# Patient Record
Sex: FEMALE | Race: OTHER | URBAN - METROPOLITAN AREA
[De-identification: names, ages, dates, MRNs, and addresses within clinical notes are randomized per-mention and may not be internally consistent; named-entity substitution may affect disease eponyms.]

---

## 2017-01-29 ENCOUNTER — INPATIENT (INPATIENT)
Facility: HOSPITAL | Age: 82
LOS: 10 days | Discharge: NURSING, REHAB OR LONG-TERM CARE | DRG: 56 | End: 2017-02-09
Attending: SPECIALIST | Admitting: SPECIALIST
Payer: MEDICARE

## 2017-01-29 VITALS
OXYGEN SATURATION: 96 % | SYSTOLIC BLOOD PRESSURE: 143 MMHG | DIASTOLIC BLOOD PRESSURE: 70 MMHG | HEART RATE: 86 BPM | RESPIRATION RATE: 18 BRPM | TEMPERATURE: 98 F

## 2017-01-29 DIAGNOSIS — D72.829 ELEVATED WHITE BLOOD CELL COUNT, UNSPECIFIED: ICD-10-CM

## 2017-01-29 DIAGNOSIS — F03.90 UNSPECIFIED DEMENTIA, UNSPECIFIED SEVERITY, WITHOUT BEHAVIORAL DISTURBANCE, PSYCHOTIC DISTURBANCE, MOOD DISTURBANCE, AND ANXIETY: ICD-10-CM

## 2017-01-29 DIAGNOSIS — R63.8 OTHER SYMPTOMS AND SIGNS CONCERNING FOOD AND FLUID INTAKE: ICD-10-CM

## 2017-01-29 DIAGNOSIS — Z41.8 ENCOUNTER FOR OTHER PROCEDURES FOR PURPOSES OTHER THAN REMEDYING HEALTH STATE: ICD-10-CM

## 2017-01-29 DIAGNOSIS — I63.9 CEREBRAL INFARCTION, UNSPECIFIED: ICD-10-CM

## 2017-01-29 DIAGNOSIS — E87.1 HYPO-OSMOLALITY AND HYPONATREMIA: ICD-10-CM

## 2017-01-29 DIAGNOSIS — I99.8 OTHER DISORDER OF CIRCULATORY SYSTEM: ICD-10-CM

## 2017-01-29 DIAGNOSIS — E86.0 DEHYDRATION: ICD-10-CM

## 2017-01-29 DIAGNOSIS — E11.9 TYPE 2 DIABETES MELLITUS WITHOUT COMPLICATIONS: ICD-10-CM

## 2017-01-29 LAB
ALBUMIN SERPL ELPH-MCNC: 2.2 G/DL — LOW (ref 3.4–5)
ALP SERPL-CCNC: 84 U/L — SIGNIFICANT CHANGE UP (ref 40–120)
ALT FLD-CCNC: 20 U/L — SIGNIFICANT CHANGE UP (ref 12–42)
ANION GAP SERPL CALC-SCNC: 8 MMOL/L — LOW (ref 9–16)
APPEARANCE UR: SIGNIFICANT CHANGE UP
APTT BLD: 25.9 SEC — LOW (ref 27.5–37.4)
AST SERPL-CCNC: 19 U/L — SIGNIFICANT CHANGE UP (ref 15–37)
BASOPHILS NFR BLD AUTO: 0.6 % — SIGNIFICANT CHANGE UP (ref 0–2)
BILIRUB SERPL-MCNC: 0.2 MG/DL — SIGNIFICANT CHANGE UP (ref 0.2–1.2)
BILIRUB UR-MCNC: NEGATIVE — SIGNIFICANT CHANGE UP
BUN SERPL-MCNC: 29 MG/DL — HIGH (ref 7–23)
CALCIUM SERPL-MCNC: 8.3 MG/DL — LOW (ref 8.5–10.5)
CHLORIDE SERPL-SCNC: 101 MMOL/L — SIGNIFICANT CHANGE UP (ref 96–108)
CO2 SERPL-SCNC: 21 MMOL/L — LOW (ref 22–31)
COLOR SPEC: YELLOW — SIGNIFICANT CHANGE UP
CREAT SERPL-MCNC: 0.32 MG/DL — LOW (ref 0.5–1.3)
DIFF PNL FLD: NEGATIVE — SIGNIFICANT CHANGE UP
EOSINOPHIL NFR BLD AUTO: 1.5 % — SIGNIFICANT CHANGE UP (ref 0–6)
GLUCOSE SERPL-MCNC: 106 MG/DL — HIGH (ref 70–99)
GLUCOSE UR QL: NEGATIVE — SIGNIFICANT CHANGE UP
HCT VFR BLD CALC: 28.3 % — LOW (ref 34.5–45)
HGB BLD-MCNC: 9.4 G/DL — LOW (ref 11.5–15.5)
INR BLD: 1.07 — SIGNIFICANT CHANGE UP (ref 0.88–1.16)
KETONES UR-MCNC: NEGATIVE — SIGNIFICANT CHANGE UP
LACTATE SERPL-SCNC: 2.9 MMOL/L — HIGH (ref 0.5–2)
LEUKOCYTE ESTERASE UR-ACNC: NEGATIVE — SIGNIFICANT CHANGE UP
LYMPHOCYTES # BLD AUTO: 11.5 % — LOW (ref 13–44)
MCHC RBC-ENTMCNC: 23.6 PG — LOW (ref 27–34)
MCHC RBC-ENTMCNC: 33.2 G/DL — SIGNIFICANT CHANGE UP (ref 32–36)
MCV RBC AUTO: 71.1 FL — LOW (ref 80–100)
MONOCYTES NFR BLD AUTO: 7.5 % — SIGNIFICANT CHANGE UP (ref 2–14)
NEUTROPHILS NFR BLD AUTO: 78.9 % — HIGH (ref 43–77)
NITRITE UR-MCNC: NEGATIVE — SIGNIFICANT CHANGE UP
PH UR: 7 — SIGNIFICANT CHANGE UP (ref 4–8)
PLATELET # BLD AUTO: 369 K/UL — SIGNIFICANT CHANGE UP (ref 150–400)
POTASSIUM SERPL-MCNC: 5.3 MMOL/L — SIGNIFICANT CHANGE UP (ref 3.5–5.3)
POTASSIUM SERPL-SCNC: 5.3 MMOL/L — SIGNIFICANT CHANGE UP (ref 3.5–5.3)
PROT SERPL-MCNC: 6.5 G/DL — SIGNIFICANT CHANGE UP (ref 6.4–8.2)
PROT UR-MCNC: (no result) MG/DL
PROTHROM AB SERPL-ACNC: 11.9 SEC — SIGNIFICANT CHANGE UP (ref 10–13.1)
RAPID RVP RESULT: SIGNIFICANT CHANGE UP
RBC # BLD: 3.98 M/UL — SIGNIFICANT CHANGE UP (ref 3.8–5.2)
RBC # FLD: 17.8 % — HIGH (ref 10.3–16.9)
SODIUM SERPL-SCNC: 130 MMOL/L — LOW (ref 135–145)
SP GR SPEC: 1.01 — SIGNIFICANT CHANGE UP (ref 1–1.03)
UROBILINOGEN FLD QL: 0.2 E.U./DL — SIGNIFICANT CHANGE UP
WBC # BLD: 16.2 K/UL — HIGH (ref 3.8–10.5)
WBC # FLD AUTO: 16.2 K/UL — HIGH (ref 3.8–10.5)

## 2017-01-29 PROCEDURE — 71010: CPT | Mod: 26

## 2017-01-29 PROCEDURE — 99285 EMERGENCY DEPT VISIT HI MDM: CPT

## 2017-01-29 RX ORDER — LOSARTAN POTASSIUM 100 MG/1
100 TABLET, FILM COATED ORAL DAILY
Qty: 0 | Refills: 0 | Status: DISCONTINUED | OUTPATIENT
Start: 2017-01-29 | End: 2017-02-01

## 2017-01-29 RX ORDER — SODIUM CHLORIDE 9 MG/ML
125 INJECTION INTRAMUSCULAR; INTRAVENOUS; SUBCUTANEOUS ONCE
Qty: 0 | Refills: 0 | Status: COMPLETED | OUTPATIENT
Start: 2017-01-29 | End: 2017-01-29

## 2017-01-29 RX ORDER — SODIUM CHLORIDE 9 MG/ML
1000 INJECTION INTRAMUSCULAR; INTRAVENOUS; SUBCUTANEOUS ONCE
Qty: 0 | Refills: 0 | Status: COMPLETED | OUTPATIENT
Start: 2017-01-29 | End: 2017-01-29

## 2017-01-29 RX ORDER — LOSARTAN POTASSIUM 100 MG/1
1 TABLET, FILM COATED ORAL
Qty: 0 | Refills: 0 | COMMUNITY

## 2017-01-29 RX ORDER — TRAZODONE HCL 50 MG
1 TABLET ORAL
Qty: 0 | Refills: 0 | COMMUNITY

## 2017-01-29 RX ORDER — ASCORBIC ACID 60 MG
500 TABLET,CHEWABLE ORAL DAILY
Qty: 0 | Refills: 0 | Status: DISCONTINUED | OUTPATIENT
Start: 2017-01-29 | End: 2017-02-09

## 2017-01-29 RX ORDER — SENNA PLUS 8.6 MG/1
0 TABLET ORAL
Qty: 0 | Refills: 0 | COMMUNITY

## 2017-01-29 RX ORDER — GABAPENTIN 400 MG/1
100 CAPSULE ORAL
Qty: 0 | Refills: 0 | Status: DISCONTINUED | OUTPATIENT
Start: 2017-01-29 | End: 2017-01-29

## 2017-01-29 RX ORDER — INSULIN LISPRO 100/ML
VIAL (ML) SUBCUTANEOUS
Qty: 0 | Refills: 0 | Status: DISCONTINUED | OUTPATIENT
Start: 2017-01-29 | End: 2017-02-01

## 2017-01-29 RX ORDER — GABAPENTIN 400 MG/1
0 CAPSULE ORAL
Qty: 0 | Refills: 0 | COMMUNITY

## 2017-01-29 RX ORDER — GABAPENTIN 400 MG/1
1 CAPSULE ORAL
Qty: 0 | Refills: 0 | COMMUNITY

## 2017-01-29 RX ORDER — FOLIC ACID 0.8 MG
1 TABLET ORAL
Qty: 0 | Refills: 0 | COMMUNITY

## 2017-01-29 RX ORDER — LORATADINE 10 MG/1
10 TABLET ORAL DAILY
Qty: 0 | Refills: 0 | Status: DISCONTINUED | OUTPATIENT
Start: 2017-01-29 | End: 2017-01-29

## 2017-01-29 RX ORDER — SODIUM CHLORIDE 9 MG/ML
1000 INJECTION INTRAMUSCULAR; INTRAVENOUS; SUBCUTANEOUS
Qty: 0 | Refills: 0 | Status: DISCONTINUED | OUTPATIENT
Start: 2017-01-29 | End: 2017-01-30

## 2017-01-29 RX ORDER — FOLIC ACID 0.8 MG
1 TABLET ORAL DAILY
Qty: 0 | Refills: 0 | Status: DISCONTINUED | OUTPATIENT
Start: 2017-01-29 | End: 2017-02-09

## 2017-01-29 RX ORDER — FOLIC ACID 0.8 MG
0 TABLET ORAL
Qty: 0 | Refills: 0 | COMMUNITY

## 2017-01-29 RX ORDER — LOSARTAN POTASSIUM 100 MG/1
0 TABLET, FILM COATED ORAL
Qty: 0 | Refills: 0 | COMMUNITY

## 2017-01-29 RX ORDER — ATORVASTATIN CALCIUM 80 MG/1
1 TABLET, FILM COATED ORAL
Qty: 0 | Refills: 0 | COMMUNITY

## 2017-01-29 RX ORDER — TRAZODONE HCL 50 MG
100 TABLET ORAL AT BEDTIME
Qty: 0 | Refills: 0 | Status: DISCONTINUED | OUTPATIENT
Start: 2017-01-29 | End: 2017-01-29

## 2017-01-29 RX ORDER — ATORVASTATIN CALCIUM 80 MG/1
0 TABLET, FILM COATED ORAL
Qty: 0 | Refills: 0 | COMMUNITY

## 2017-01-29 RX ORDER — LORATADINE 10 MG/1
0 TABLET ORAL
Qty: 0 | Refills: 0 | COMMUNITY

## 2017-01-29 RX ORDER — LORATADINE 10 MG/1
1 TABLET ORAL
Qty: 0 | Refills: 0 | COMMUNITY

## 2017-01-29 RX ORDER — ATORVASTATIN CALCIUM 80 MG/1
20 TABLET, FILM COATED ORAL AT BEDTIME
Qty: 0 | Refills: 0 | Status: DISCONTINUED | OUTPATIENT
Start: 2017-01-29 | End: 2017-02-09

## 2017-01-29 RX ORDER — ASCORBIC ACID 60 MG
1 TABLET,CHEWABLE ORAL
Qty: 0 | Refills: 0 | COMMUNITY

## 2017-01-29 RX ORDER — TRAZODONE HCL 50 MG
0 TABLET ORAL
Qty: 0 | Refills: 0 | COMMUNITY

## 2017-01-29 RX ORDER — HEPARIN SODIUM 5000 [USP'U]/ML
5000 INJECTION INTRAVENOUS; SUBCUTANEOUS EVERY 12 HOURS
Qty: 0 | Refills: 0 | Status: DISCONTINUED | OUTPATIENT
Start: 2017-01-29 | End: 2017-02-06

## 2017-01-29 RX ADMIN — SODIUM CHLORIDE 1000 MILLILITER(S): 9 INJECTION INTRAMUSCULAR; INTRAVENOUS; SUBCUTANEOUS at 14:03

## 2017-01-29 RX ADMIN — GABAPENTIN 100 MILLIGRAM(S): 400 CAPSULE ORAL at 18:48

## 2017-01-29 RX ADMIN — HEPARIN SODIUM 5000 UNIT(S): 5000 INJECTION INTRAVENOUS; SUBCUTANEOUS at 18:48

## 2017-01-29 RX ADMIN — SODIUM CHLORIDE 75 MILLILITER(S): 9 INJECTION INTRAMUSCULAR; INTRAVENOUS; SUBCUTANEOUS at 18:18

## 2017-01-29 RX ADMIN — SODIUM CHLORIDE 125 MILLILITER(S): 9 INJECTION INTRAMUSCULAR; INTRAVENOUS; SUBCUTANEOUS at 15:49

## 2017-01-29 NOTE — ED ADULT TRIAGE NOTE - CHIEF COMPLAINT QUOTE
BIBA from home c/o decreased appetite as per son and requiring peg tube. Son also reports multiple bed sores. Denies fever.

## 2017-01-29 NOTE — H&P ADULT. - ASSESSMENT
Patient is a 89 yo Female pmh of multiple CVAs many years ago, contracted and non-verbal at baseline, HTN, DM2, who was brought in by family member for worsening PO intake

## 2017-01-29 NOTE — H&P ADULT. - PROBLEM SELECTOR PLAN 4
Likely end stage, family wants full treatment at this point.  Consider palliative consult if no PEG tube. With toes cyanosis, poor candidate for any type of intervention per vascular surgery.  Will continue to monitor.

## 2017-01-29 NOTE — PROGRESS NOTE ADULT - SUBJECTIVE AND OBJECTIVE BOX
As per patient's son - she is non-ambulatory, non-verbal.   89yoF brought in by family for decreased po intake x several days. No vomiting or diarrhea. Pt with hx of dementia and multiple CVA with residual BUE and BLE contractures, complicated by unstageable sacral decubitus and BL LE ulcers. Family and HHA report that pt normally take po food, generally good appetite. Her baseline mental status waxes and wanes between responding with certain words and being awake and then being more somnolent and sleeping. No change in breathing. Pt had similar sx previously, had PEG tube placed, which helped her become more awake, alert and responsive and functional. She was seen by Dr. Lucero of GI, who planned for PEG tube placement to help rehydrate and provide appropriate nutrition for her. Family also states that she was treated with abx 2 weeks ago for possible infection of her sacral decubitus ulcer and also that the toes of her L feet have been blue for the past few day      PAST MEDICAL & SURGICAL HISTORY:  Hypercholesteremia  Diabetes  CVA (cerebral vascular accident)  HTN (hypertension)      ROS: See HPI    MEDICATIONS  (STANDING):    MEDICATIONS  (PRN):      Allergies    No Known Allergies    Intolerances        SOCIAL HISTORY:  Smoke: Never Smoker  EtOH: occasional    FAMILY HISTORY:      Vital Signs Last 24 Hrs  T(C): 36.6, Max: 36.9 ( @ 12:29)  T(F): 97.8, Max: 98.5 ( @ 12:29)  HR: 71 (71 - 86)  BP: 176/58 (143/70 - 176/58)  BP(mean): --  RR: 16 (16 - 18)  SpO2: 98% (96% - 98%)    PHYSICAL EXAM  Exam:   Neuro: AOX3, calm, NAD.   Gen: extremely cachectic, malnourished, de-conditioned female  HEENT: AT, NC  Neck: No JVD, Normal thyroid, NO carotid bruits bilaterally.   Res: Nml BS, CTAB, no wheezes/rhales or rhonchi.   CV: Normal HS, RRR, no MRG  Pulses: right DP/PT palpable.   Wounds: multiple unstageable pressure wounds in flexor areas of body due to extreme contracture, exam limited.   Left lateral foot - cyanotic, no doppler signal, dry gangrenous wound 2x6 on lateral aspect of foot.   Abd: soft, ND, NT, no masses or organomegaly appreciated      LABS:                        9.4    16.2  )-----------( 369      ( 2017 13:35 )             28.3     2017 13:35    130    |  101    |  29     ----------------------------<  106    5.3     |  21     |  0.32     Ca    8.3        2017 13:35    TPro  6.5    /  Alb  2.2    /  TBili  0.2    /  DBili  x      /  AST  19     /  ALT  20     /  AlkPhos  84     2017 13:35    PT/INR - ( 2017 13:45 )   PT: 11.9 sec;   INR: 1.07          PTT - ( 2017 13:45 )  PTT:25.9 sec  Urinalysis Basic - ( 2017 15:23 )    Color: Yellow / Appearance: SL CLOUDY / S.010 / pH: x  Gluc: x / Ketone: NEGATIVE  / Bili: NEGATIVE / Urobili: 0.2 E.U./dL   Blood: x / Protein: Trace mg/dL / Nitrite: NEGATIVE   Leuk Esterase: NEGATIVE / RBC: < 5 /HPF / WBC < 5 /HPF   Sq Epi: x / Non Sq Epi: Rare /HPF / Bacteria: Present /HPF        RADIOLOGY & ADDITIONAL STUDIES:    Assessment and Plan:  89yFemale  - extremely deconditioned, contracted with multiple unstageable pressure wounds with Left lateral foot wound, dry gangrene - likely secondary to contractures and pressure.   - gangrene/necrosis is dry and does not appear infected, no surgical intervention at this time or re-vascularization planned due to patients extremely poor health status.   - vascular surgery signing off - please reconsult with any additional questions.

## 2017-01-29 NOTE — ED PROVIDER NOTE - SKIN, MLM
unstageable sacral decubitus ulcer with no surrounding cellulitis. +unstagable ulcer in L popliteal fossa with no purulent drainage or cellulitis, +stage 4 ulcer in R popliteal fossa.

## 2017-01-29 NOTE — H&P ADULT. - PROBLEM SELECTOR PLAN 3
Likely hypovolemic hyponatremia, will f/u with BMP after fluid resuscitation.  Will f/u with serum osmo and urine lytes.

## 2017-01-29 NOTE — ED PROVIDER NOTE - OBJECTIVE STATEMENT
89yoF brought in by family for decreased po intake x several days. No vomiting or diarrhea. Pt with hx of dementia and multiple CVA with residual BUE and BLE contractures, complicated by sacral decubitus and BLE ulcers. Family and HHA report that pt normally take po food, generally good appetite. Her baseline mental status waxes and wanes between responding with certain words and being awake and then being more somnolent and sleeping. No change in breathing. Pt had similar sx previously, had PEG tube placed, which helped her become more awake, alert and responsive and functional. She was seen by Dr. Lucero of GI, who planned for PEG tube placement to help rehydrate and provide appropriate nutrition for her. Family also states that she was treated with abx 2 weeks ago for possible infection of her sacral decubitus ulcer and also that the toes of her L feet have been blue for the past few days. No fevers.

## 2017-01-29 NOTE — H&P ADULT. - PROBLEM SELECTOR PLAN 1
Likely due to poor PO intake from end stage dementia.   S/p 1L bolus in the ED, patient still dry on exam, will another 500cc bolus and start fluid at 75cc/hour.  Speech and swallow consult in the AM.  Will f/u with Dr. Lucero in the AM regarding PEG tube. Likely due to poor PO intake from end stage dementia and sedating medications.   S/p 1L bolus in the ED, patient still dry on exam, will another 500cc bolus and start fluid at 75cc/hour.  Holding Trazadone, gabapentin, and lorantine due to sedation.  Speech and swallow consult in the AM.  Will f/u with Dr. Lucero in the AM regarding PEG tube.

## 2017-01-29 NOTE — H&P ADULT. - PROBLEM SELECTOR PLAN 5
Chronically contracted and non-verbal.   Continue with statin. Likely end stage, family wants full treatment at this point.  Consider palliative consult if no PEG tube.

## 2017-01-29 NOTE — ED PROVIDER NOTE - MUSCULOSKELETAL, MLM
contractures to BUE and BLE, L foot exam: cyanosis from toes to midfoot, no palpable PT or DP pulses, no dopplerable PT or DP pulses, cool to touch.

## 2017-01-29 NOTE — ED PROVIDER NOTE - PROGRESS NOTE DETAILS
spoke with vascular, will come see pt in ED spoke with Dr. Lucero, pt's GI MD, pt to be worked up for dehydration vs sepsis and vascular eval for L foot cyanosis, if no intervention to be done by them, then pt to be admitted to his service. pt seen by vascular, no acute intervention to be done, will follow inpt.

## 2017-01-29 NOTE — ED PROVIDER NOTE - MEDICAL DECISION MAKING DETAILS
89yoF here with decreased po intake x several day, o/w at her baseline mental status. Of note, 89yoF here with decreased po intake x several day, o/w at her baseline mental status. Of note, pt with cyanosis to L foot, no palpable or dopplerable pulses on exam, vascular consulted, no acute intervention recommended at this time. Labs with leukocytosis, though no obvious infectious etiology, will send RVP. Elevated lactate likely related to dehydration as pt also with elevated BUN. Pt getting IVF, CXR negative, ua negative, blood/urine cultures pending, hemodynamically stable, admit to medicine.

## 2017-01-29 NOTE — ED ADULT NURSE NOTE - OBJECTIVE STATEMENT
pt to ER for prearranged admission for PEG tube placement after report of not eating or drinking for past two days.  Pt noted to have several pressure ulcers:  approx 6cm unstageable w/ tunneling at sacrum, approx 4cm stage 4 pressure ulcers to R. of sacrum and approx 4cm pressure ulcer to L. of sacrum, approx 3cm stage 2 ulcer to L. trochanter, approx 4cm unstageable ulcer behind R. knee, approx 3 cm stage 4 ulcer behind L. knee, approx 2cm forest area of eschar to lateral side of L. foot above toe 5.  pt no responsive, at times opens eyes but not tracking eyes, breathing slightly labored, skin warm to touch, VSS.  IV access established, labs drawn and sent. IV fluids given per md order.  Will continue to monitor. pt to ER for prearranged admission for PEG tube placement after report of not eating or drinking for past two days.  Sever flexion contractures noted at pt's knees and elbows.  Pt noted to have several pressure ulcers:  approx 6cm unstageable w/ tunneling at sacrum, approx 4cm stage 4 pressure ulcers to R. of sacrum and approx 4cm pressure ulcer to L. of sacrum, approx 3cm stage 2 ulcer to L. trochanter, approx 4cm unstageable ulcer behind R. knee, approx 3 cm stage 4 ulcer behind L. knee, approx 2cm forest area of eschar to lateral side of L. foot above toe 5.  pt no responsive, at times opens eyes but not tracking eyes, breathing slightly labored, skin warm to touch, VSS.  IV access established, labs drawn and sent. IV fluids given per md order.  Will continue to monitor.

## 2017-01-29 NOTE — H&P ADULT. - SKIN COMMENTS
Stage 4 decubs ulcer on sacral, various stages of ulcer on R. popliteal fossa, L posterior calf, black eschar left and right lateral left feet

## 2017-01-29 NOTE — H&P ADULT. - HISTORY OF PRESENT ILLNESS
Patient is a 89 yo Female pmh of multiple CVAs many years ago, 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 Per family, poor PO intake has been going on for many months, gradually decreasing. Per home health aid, patient had very little PO intake for past 2 days. Denied fever/chill, no nausea/vomiting, no sign of distress. About 5 years ago patient had a PEG tube, which improved her nutritional status tremendously so PEG tube was subsequently removed when patient started to tolerate PO again. Family members are hoping another PEG tube can be placed this time. Family member also reported various pressure ulcers on patient buttocks, sacral and lower extremities, which she was getting regular wound care at home by visiting nurse, however wounds are not healing as much lately. Denied worsening erythema or discharge from wounds.  In ED, vitals were stable, noted her cyanosis on her bilateral feet, vascular was consulted, no intervention at this time. She was given 1L bolus NS. CXR and UA were unremarkable.

## 2017-01-29 NOTE — H&P ADULT. - PROBLEM SELECTOR PLAN 2
WBC elevation likely due to dehydration, no sign of aspiration PNA on CXR, UA clear and decubitus ulcer appear non-infectious. Vital signs stable.  Will hold abx for now.  Wound consult in the AM for decubitus ulcers.

## 2017-01-30 DIAGNOSIS — R53.2 FUNCTIONAL QUADRIPLEGIA: ICD-10-CM

## 2017-01-30 DIAGNOSIS — L89.90 PRESSURE ULCER OF UNSPECIFIED SITE, UNSPECIFIED STAGE: ICD-10-CM

## 2017-01-30 DIAGNOSIS — D64.9 ANEMIA, UNSPECIFIED: ICD-10-CM

## 2017-01-30 DIAGNOSIS — R62.7 ADULT FAILURE TO THRIVE: ICD-10-CM

## 2017-01-30 DIAGNOSIS — R13.10 DYSPHAGIA, UNSPECIFIED: ICD-10-CM

## 2017-01-30 DIAGNOSIS — I96 GANGRENE, NOT ELSEWHERE CLASSIFIED: ICD-10-CM

## 2017-01-30 DIAGNOSIS — Z51.5 ENCOUNTER FOR PALLIATIVE CARE: ICD-10-CM

## 2017-01-30 LAB
ALBUMIN SERPL ELPH-MCNC: 2.1 G/DL — LOW (ref 3.4–5)
ANION GAP SERPL CALC-SCNC: 10 MMOL/L — SIGNIFICANT CHANGE UP (ref 9–16)
BUN SERPL-MCNC: 17 MG/DL — SIGNIFICANT CHANGE UP (ref 7–23)
CALCIUM SERPL-MCNC: 8.2 MG/DL — LOW (ref 8.5–10.5)
CHLORIDE SERPL-SCNC: 106 MMOL/L — SIGNIFICANT CHANGE UP (ref 96–108)
CO2 SERPL-SCNC: 19 MMOL/L — LOW (ref 22–31)
CREAT SERPL-MCNC: 0.3 MG/DL — LOW (ref 0.5–1.3)
GLUCOSE SERPL-MCNC: 102 MG/DL — HIGH (ref 70–99)
HCT VFR BLD CALC: 22.8 % — LOW (ref 34.5–45)
HGB BLD-MCNC: 7.5 G/DL — LOW (ref 11.5–15.5)
MCHC RBC-ENTMCNC: 23.2 PG — LOW (ref 27–34)
MCHC RBC-ENTMCNC: 32.9 G/DL — SIGNIFICANT CHANGE UP (ref 32–36)
MCV RBC AUTO: 70.6 FL — LOW (ref 80–100)
OSMOLALITY SERPL: 274 MOSM/KG — LOW (ref 280–301)
PLATELET # BLD AUTO: 408 K/UL — HIGH (ref 150–400)
POTASSIUM SERPL-MCNC: 4.3 MMOL/L — SIGNIFICANT CHANGE UP (ref 3.5–5.3)
POTASSIUM SERPL-SCNC: 4.3 MMOL/L — SIGNIFICANT CHANGE UP (ref 3.5–5.3)
RBC # BLD: 3.23 M/UL — LOW (ref 3.8–5.2)
RBC # FLD: 17.4 % — HIGH (ref 10.3–16.9)
SODIUM SERPL-SCNC: 135 MMOL/L — SIGNIFICANT CHANGE UP (ref 135–145)
WBC # BLD: 11.8 K/UL — HIGH (ref 3.8–10.5)
WBC # FLD AUTO: 11.8 K/UL — HIGH (ref 3.8–10.5)

## 2017-01-30 PROCEDURE — 99223 1ST HOSP IP/OBS HIGH 75: CPT

## 2017-01-30 PROCEDURE — 99497 ADVNCD CARE PLAN 30 MIN: CPT | Mod: 25

## 2017-01-30 RX ORDER — SODIUM CHLORIDE 9 MG/ML
1000 INJECTION INTRAMUSCULAR; INTRAVENOUS; SUBCUTANEOUS
Qty: 0 | Refills: 0 | Status: DISCONTINUED | OUTPATIENT
Start: 2017-01-30 | End: 2017-01-31

## 2017-01-30 RX ADMIN — HEPARIN SODIUM 5000 UNIT(S): 5000 INJECTION INTRAVENOUS; SUBCUTANEOUS at 06:55

## 2017-01-30 RX ADMIN — HEPARIN SODIUM 5000 UNIT(S): 5000 INJECTION INTRAVENOUS; SUBCUTANEOUS at 18:53

## 2017-01-30 NOTE — CONSULT NOTE ADULT - SUBJECTIVE AND OBJECTIVE BOX
CHUCK LUCIO   MRN-[id number]     [id number]    HPI:  Patient is a 89 yo Female pmh of multiple CVAs many years ago, contracted and non-verbal at baseline x years, HTN, DM2, who was brought in by family for worsening PO intake and decline in mental status. Per family, pt had PEG tube placed years ago after stroke left her with significant dysphagia and hemiparesis. Pt's dysphagia improved and pt was able to eat independently. Her PEG tube became dislodged accidentally, so it was left out as pt was eating well. Over last few weeks, pt has had decline in PO intake and overall mental status. In addition, pt has had chronic non-healing wounds which have worsened over last few weeks despite aggressive wound care by family, [facility] wound care and 24 hour aides at home. Seen by their PMD in the home and told to come to the hospital for repeat PEG tube to improve nutritional status in hopes that this might allow for improved wound healing.     PAST MEDICAL & SURGICAL HISTORY:  Hypercholesteremia  Diabetes  CVA (cerebral vascular accident)  HTN (hypertension)  No significant past surgical history      FAMILY HISTORY:  No pertinent family history in first degree relatives    Reviewed     ROS:    Unable to attain due to:  severe dementia/multiple CVA's                    Dyspnea (Benson 0-10):   0                     N/V (Y/N):   N                           Secretions (Y/N) : Y               Agitation(Y/N): N  Pain (Y/N): N      -Provocation/Palliation: Unknown  -Quality/Quantity: Unknown  -Radiating: Unknown  -Severity: Unknown  -Timing/Frequency: Months  -Impact on ADLs:        Allergies    No Known Allergies    Intolerances    Opiate Naive (Y/N): Y  -iStop reviewed (Y/N): N    Medications:      MEDICATIONS  (STANDING):  losartan 100milliGRAM(s) Oral daily  diltiazem    Tablet 180milliGRAM(s) Oral two times a day  atorvastatin 20milliGRAM(s) Oral at bedtime  folic acid 1milliGRAM(s) Oral daily  ascorbic acid 500milliGRAM(s) Oral daily  heparin  Injectable 5000Unit(s) SubCutaneous every 12 hours  insulin lispro (HumaLOG) corrective regimen sliding scale  SubCutaneous Before meals and at bedtime  sodium chloride 0.9%. 1000milliLiter(s) IV Continuous <Continuous>    MEDICATIONS  (PRN):  bisacodyl 5milliGRAM(s) Oral every 12 hours PRN Constipation      Labs:    CBC:                        7.5    11.8  )-----------( 408      ( 2017 08:38 )             22.8     CMP:    2017 08:38    135    |  106    |  17     ----------------------------<  102    4.3     |  19     |  0.30     Ca    8.2        2017 08:38    TPro  x      /  Alb  2.1    /  TBili  x      /  DBili  x      /  AST  x      /  ALT  x      /  AlkPhos  x      2017 08:38    PT/INR - ( 2017 13:45 )   PT: 11.9 sec;   INR: 1.07          PTT - ( 2017 13:45 )  PTT:25.9 sec  Urinalysis Basic - ( 2017 15:23 )    Color: Yellow / Appearance: SL CLOUDY / S.010 / pH: x  Gluc: x / Ketone: NEGATIVE  / Bili: NEGATIVE / Urobili: 0.2 E.U./dL   Blood: x / Protein: Trace mg/dL / Nitrite: NEGATIVE   Leuk Esterase: NEGATIVE / RBC: < 5 /HPF / WBC < 5 /HPF   Sq Epi: x / Non Sq Epi: Rare /HPF / Bacteria: Present /HPF        Imaging:  Reviewed    PEx:  T(C): 37.1, Max: 37.1 ( @ 17:27)  HR: 89 (57 - 89)  BP: 159/86 (131/54 - 185/68)  RR: 18 (16 - 18)  SpO2: 96% (94% - 96%)  Wt(kg): --  Daily Height in cm: 157.48 (2017 19:45)    Daily Weight in k.8 (2017 10:07)  CAPILLARY BLOOD GLUCOSE  91 (2017 17:27)    I&O's Summary  I & Os for 24h ending 2017 07:00  =============================================  IN: 750 ml / OUT: 150 ml / NET: 600 ml    I & Os for current day (as of 2017 17:36)  =============================================  IN: 0 ml / OUT: 1200 ml / NET: -1200 ml      General: eyes open to tactile stimuli. non-verbal, does not follow commands or answer any questions. bedbound with severe contractures of all 4 limbs.   HEENT:  moderate oral secretions  Neck: no JVD  CVS: RRR  Resp: CTAB  GI:  soft, nontender  :  hussein in place  Musc:   severe contractures of all 4 limbs  Neuro: eyes open to tactile stimuli but does not follow commands.   Psych:  calm  Skin: multiple decubiti to sacrum, calves, heels, bony prominences over hips. severe bilat peripheral dry gangrene  Lymph:  Preadmit Karnofsky:  10%           Current Karnofsky:    10 %  Cachexia (Y/N): Y   BMI: 14.8    Advanced Directives:     Full Code         Decision maker: Kip Marie)   Legal surrogate: Kip Marie)     Social History:     GOALS OF CARE DISCUSSION       Palliative care info/counseling provided	           Family meeting       Documentation of GOC: 	          REFERRALS	                 [facility]

## 2017-01-30 NOTE — DIETITIAN INITIAL EVALUATION ADULT. - ENERGY NEEDS
Height: 5 feet 2 inches, Weight (Current): 80 pounds,  pounds +/-10%, %IBW 72.7%, BMI 14.8    IBW used to calculate energy needs due to pt's current body weight is less than % of IBW   EER based on PUs + malnutrition

## 2017-01-30 NOTE — SWALLOW BEDSIDE ASSESSMENT ADULT - ADDITIONAL RECOMMENDATIONS
Pt is at extremely high risk for aspiration, given this recommend NPO unless family documents they wish to accept aspiration risk and initiate comfort feeds.

## 2017-01-30 NOTE — DIETITIAN INITIAL EVALUATION ADULT. - NS AS NUTRI INTERV ENTERAL NUTRITION
If PO diet is not medically feasible s/p swallow recommend alternate means of nutrition: Recommend Glucerna 1.2 x 24 hrs, GR 53ml/hr. Will provide TV 1272, 1526kcal, 76 gm prot, 1023 ml fluids (31kcal/kg, 1.5gm prot/kg, Based on 49.8 kg IBW). Monitor for s/s of tolerance.

## 2017-01-30 NOTE — CONSULT NOTE ADULT - PROBLEM SELECTOR RECOMMENDATION 7
Extensive discussion with pt's Son (HCP) about Mrs. Callahan's life as a Holocaust survivor and her values could she have the opportunity to express them. Son states that they come from a very Buddhism Mu-ism background, and that they are inclined to follow recommendations from their Rabbi that the feeding tube be placed. Son expressed understanding that patient's prognosis in setting of severe dementia and chronic non-healing wounds is likely weeks to months regardless of PEG tube placement. Son was realistic in his expressed sentiments. We discussed possibility of providing more services in the home to improve family support. Will make [facility] hospice referral tomorrow. We will continue to follow this patient.

## 2017-01-30 NOTE — DIETITIAN INITIAL EVALUATION ADULT. - PROBLEM SELECTOR PLAN 1
Likely due to poor PO intake from end stage dementia and sedating medications.   S/p 1L bolus in the ED, patient still dry on exam, will another 500cc bolus and start fluid at 75cc/hour.  Holding Trazadone, gabapentin, and lorantine due to sedation.  Speech and swallow consult in the AM.  Will f/u with Dr. Lucero in the AM regarding PEG tube.

## 2017-01-30 NOTE — CHART NOTE - NSCHARTNOTEFT_GEN_A_CORE
Upon Nutritional Assessment by the Registered Dietitian your patient was determined to meet criteria / has evidence of the following diagnosis/diagnoses:          [ ]  Mild Protein Calorie Malnutrition        [ x ]  Moderate Protein Calorie Malnutrition        [ ] Severe Protein Calorie Malnutrition        [ ] Unspecified Protein Calorie Malnutrition        [ ] Underweight / BMI <19        [ ] Morbid Obesity / BMI > 40      Findings as based on:  •  Comprehensive nutrition assessment and consultation      pt with decreasing PO intake x months, visible appearance, BMI 14.8, %IBW 72.7%    Treatment:  see IA  The following diet has been recommended: see IA      PROVIDER Section:     By signing this assessment you are acknowledging and agree with the diagnosis/diagnoses assigned by the Registered Dietitian    Comments:

## 2017-01-30 NOTE — PROGRESS NOTE ADULT - SUBJECTIVE AND OBJECTIVE BOX
HPI:  Patient is a 89 yo Female pmh of multiple CVAs many years ago, 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 Per family, poor PO intake has been going on for many months, gradually decreasing. Per home health aid, patient had very little PO intake for past 2 days. Denied fever/chill, no nausea/vomiting, no sign of distress. About 5 years ago patient had a PEG tube, which improved her nutritional status tremendously so PEG tube was subsequently removed when patient started to tolerate PO again. Family members are hoping another PEG tube can be placed this time. Family member also reported various pressure ulcers on patient buttocks, sacral and lower extremities, which she was getting regular wound care at home by visiting nurse, however wounds are not healing as much lately. Denied worsening erythema or discharge from wounds.  In ED, vitals were stable, noted her cyanosis on her bilateral feet, vascular was consulted, no intervention at this time. She was given 1L bolus NS. CXR and UA were unremarkable admitted for further care      REVIEW OF SYSTEMS:  patient unable to give    PAST MEDICAL & SURGICAL HISTORY:  Hypercholesteremia  Diabetes  CVA (cerebral vascular accident)  HTN (hypertension)  No significant past surgical history      FAMILY HISTORY:  No pertinent family history in first degree relatives      SOCIAL HISTORY:  Smoking Status: [ ] Current, [ ] Former, [ ] Never  Pack Years:    MEDICATIONS:  MEDICATIONS  (STANDING):  losartan 100milliGRAM(s) Oral daily  diltiazem    Tablet 180milliGRAM(s) Oral two times a day  atorvastatin 20milliGRAM(s) Oral at bedtime  folic acid 1milliGRAM(s) Oral daily  ascorbic acid 500milliGRAM(s) Oral daily  heparin  Injectable 5000Unit(s) SubCutaneous every 12 hours  insulin lispro (HumaLOG) corrective regimen sliding scale  SubCutaneous Before meals and at bedtime  sodium chloride 0.9%. 1000milliLiter(s) IV Continuous <Continuous>    MEDICATIONS  (PRN):  bisacodyl 5milliGRAM(s) Oral every 12 hours PRN Constipation      Allergies    No Known Allergies    Intolerances        Vital Signs Last 24 Hrs  T(C): 36.8, Max: 36.9 (01-29 @ 19:45)  T(F): 98.2, Max: 98.4 (01-29 @ 19:45)  HR: 80 (57 - 80)  BP: 151/55 (131/54 - 185/68)  BP(mean): --  RR: 18 (16 - 18)  SpO2: 96% (94% - 98%)  I & Os for 24h ending 01-30 @ 07:00  =============================================  IN: 750 ml / OUT: 150 ml / NET: 600 ml    I & Os for current day (as of 01-30 @ 14:40)  =============================================  IN: 0 ml / OUT: 450 ml / NET: -450 ml        PHYSICAL EXAM:    General:  in no acute distress, chronic weight loss   HEENT: MMM, conjunctiva and sclera clear  Lungs: poor inspiration  Heart: regular  Gastrointestinal: Soft, non-tender non-distended; Normal bowel sounds; No rebound or guarding  Extremities:? contracture, left leg w/ mild necrosis/gangrene  Neurological: Alert and oriented x3  Skin: Warm and dry. No obvious rash      LABS:                        7.5    11.8  )-----------( 408      ( 30 Jan 2017 08:38 )             22.8     30 Jan 2017 08:38    135    |  106    |  17     ----------------------------<  102    4.3     |  19     |  0.30     Ca    8.2        30 Jan 2017 08:38    TPro  x      /  Alb  2.1    /  TBili  x      /  DBili  x      /  AST  x      /  ALT  x      /  AlkPhos  x      30 Jan 2017 08:38      Culture Results:   No growth at 12 hours (01-29 @ 18:45)  Culture Results:   No growth at 12 hours (01-29 @ 18:45)      RADIOLOGY & ADDITIONAL STUDIES:

## 2017-01-30 NOTE — PROGRESS NOTE ADULT - SUBJECTIVE AND OBJECTIVE BOX
INTERVAL HPI/OVERNIGHT EVENTS:TITI.     ICU Vital Signs Last 24 Hrs  T(C): 36.8, Max: 36.9 ( @ 12:29)  T(F): 98.2, Max: 98.5 ( @ 12:29)  HR: 80 (57 - 86)  BP: 151/55 (131/54 - 185/68)  BP(mean): --  ABP: --  ABP(mean): --  RR: 18 (16 - 18)  SpO2: 96% (94% - 98%)    I&O's Summary  I & Os for 24h ending 2017 07:00  =============================================  IN: 750 ml / OUT: 150 ml / NET: 600 ml    I & Os for current day (as of 2017 11:14)  =============================================  IN: 0 ml / OUT: 450 ml / NET: -450 ml    PHYSICAL EXAM:    Constitutional: NAD. Contracted. AOX0.   Eyes: No scleral icterus. No Conj Pallor.   ENMT: MMM. Neck   Neck: No JVD  Respiratory: CTABL   Cardiovascular: Normal S1 and S2 no MRG   Gastrointestinal: BS normoactive. S/NT/ND.   Extremities: WWP. DP 2 plus.  Cyanotic gangrenous toes. Ulcers thighs.   Neurological: AAOX3. CN II- XII intact. Reflexes 3 plus. Strength 5/5 upper and lower. No Dysmetria.      MEDICATIONS  (STANDING):  losartan 100milliGRAM(s) Oral daily  diltiazem    Tablet 180milliGRAM(s) Oral two times a day  atorvastatin 20milliGRAM(s) Oral at bedtime  folic acid 1milliGRAM(s) Oral daily  ascorbic acid 500milliGRAM(s) Oral daily  heparin  Injectable 5000Unit(s) SubCutaneous every 12 hours  insulin lispro (HumaLOG) corrective regimen sliding scale  SubCutaneous Before meals and at bedtime  sodium chloride 0.9%. 1000milliLiter(s) IV Continuous <Continuous>    MEDICATIONS  (PRN):  bisacodyl 5milliGRAM(s) Oral every 12 hours PRN Constipation      LABS:                        7.5    11.8  )-----------( 408      ( 2017 08:38 )             22.8     2017 08:38    135    |  106    |  17     ----------------------------<  102    4.3     |  19     |  0.30     Ca    8.2        2017 08:38    TPro  6.5    /  Alb  2.2    /  TBili  0.2    /  DBili  x      /  AST  19     /  ALT  20     /  AlkPhos  84     2017 13:35    PT/INR - ( 2017 13:45 )   PT: 11.9 sec;   INR: 1.07          PTT - ( 2017 13:45 )  PTT:25.9 sec  Urinalysis Basic - ( 2017 15:23 )    Color: Yellow / Appearance: SL CLOUDY / S.010 / pH: x  Gluc: x / Ketone: NEGATIVE  / Bili: NEGATIVE / Urobili: 0.2 E.U./dL   Blood: x / Protein: Trace mg/dL / Nitrite: NEGATIVE   Leuk Esterase: NEGATIVE / RBC: < 5 /HPF / WBC < 5 /HPF   Sq Epi: x / Non Sq Epi: Rare /HPF / Bacteria: Present /HPF      I&O's Summary  I & Os for 24h ending 2017 07:00  =============================================  IN: 750 ml / OUT: 150 ml / NET: 600 ml    I & Os for current day (as of 2017 11:14)  =============================================  IN: 0 ml / OUT: 450 ml / NET: -450 ml    RADIOLOGY & ADDITIONAL TESTS:

## 2017-01-30 NOTE — SWALLOW BEDSIDE ASSESSMENT ADULT - SLP GENERAL OBSERVATIONS
Pt received asleep in bed. Pt did not rouse given maximal verbal and tactile stim. Given this no PO trials were attempted.

## 2017-01-30 NOTE — CONSULT NOTE ADULT - PROBLEM SELECTOR RECOMMENDATION 4
Pt with progressive dysphagia likely secondary to worsening mental status in setting of severe dementia and advanced illness. Family is requesting PEG after discussion with their primary care doctor in the community as well as their Rabbi. Extensive discussion had at the bedside regarding risks/benefits of PEG tube feeding. We had a thanh discussion that PEG tube feed may not provide enough nutritional support to reach family's goal of wound healing. Family understands the risks and potential outcomes of PEG tube feeds, and would like to proceed as long as it remains an option.

## 2017-01-30 NOTE — DIETITIAN INITIAL EVALUATION ADULT. - NS AS NUTRI INTERV MEALS SNACK
If PO intake is medically feasible s/p swallow eval recommend CNSCHO + Glucerna 3x per day (660kcal, 30 gm prot)- consistency per SLP.

## 2017-01-30 NOTE — PROGRESS NOTE ADULT - PROBLEM SELECTOR PLAN 2
WBC elevation likely due to dehydration, no sign of aspiration PNA on CXR, UA clear and decubitus ulcer appear non-infectious. Vital signs stable.  Will hold abx for now.  Wound consult for decubitus ulcers.

## 2017-01-30 NOTE — CONSULT NOTE ADULT - PROBLEM SELECTOR RECOMMENDATION 3
Pt with multiple non-healing decubitus ulcers in setting of poor nutritional status. Is receiving aggressive wound care in the home provided primarily by family members, but may benefit from more services in the home. Discussed [facility] home hospice with family and will make referral for hospice services tomorrow.

## 2017-01-30 NOTE — DIETITIAN INITIAL EVALUATION ADULT. - OTHER INFO
89 YO female with hx of HTN and DM is now admitted with worsening gradual PO intake x months per h&p. per h&p pt has hx of PEG tube- PEG helped increase pt nutrition however was removed when pt was able to tolerate PO. Son @ bedside reports pt had PEG tube ~5 years and it had "fell out." Son reports they began feeding pt by PO. son is unsure as to why pt had PEG tube in the past however is intrested in pt getting another PEG @ this time. Son is unsure of UBW or recent wt changes. Son does not feel like pt has any issues chewing/swallowing however reports feeding pt foods of a "baby food consistency" such as yogurt. pt noted with order For Bedside Swallow Eval Consult in EMR & remains NPO until then. NKFA. Skin- pt with many PU: IV sacrum, II hip, unstageable knee/foot. High Nutrition Risk.

## 2017-01-30 NOTE — PROGRESS NOTE ADULT - PROBLEM SELECTOR PLAN 5
Likely end stage, family wants full treatment at this point.  Consider palliative consult if no PEG tube.

## 2017-01-30 NOTE — SWALLOW BEDSIDE ASSESSMENT ADULT - COMMENTS
Pt's son and daughter-in-law present during evaluation. They report the pt is currently at her baseline; she sleeps throughout the day and is verbally unresponsive to them. The daughter-in-law reports the pt accepts very little food and coughs during meals, indicating aspiration.

## 2017-01-30 NOTE — DIETITIAN INITIAL EVALUATION ADULT. - PROBLEM SELECTOR PLAN 4
With toes cyanosis, poor candidate for any type of intervention per vascular surgery.  Will continue to monitor.

## 2017-01-31 LAB
ANION GAP SERPL CALC-SCNC: 12 MMOL/L — SIGNIFICANT CHANGE UP (ref 9–16)
BUN SERPL-MCNC: 15 MG/DL — SIGNIFICANT CHANGE UP (ref 7–23)
CALCIUM SERPL-MCNC: 8 MG/DL — LOW (ref 8.5–10.5)
CHLORIDE SERPL-SCNC: 108 MMOL/L — SIGNIFICANT CHANGE UP (ref 96–108)
CO2 SERPL-SCNC: 18 MMOL/L — LOW (ref 22–31)
CREAT SERPL-MCNC: 0.29 MG/DL — LOW (ref 0.5–1.3)
GLUCOSE SERPL-MCNC: 73 MG/DL — SIGNIFICANT CHANGE UP (ref 70–99)
HCT VFR BLD CALC: 24.4 % — LOW (ref 34.5–45)
HGB BLD-MCNC: 7.9 G/DL — LOW (ref 11.5–15.5)
MAGNESIUM SERPL-MCNC: 1.9 MG/DL — SIGNIFICANT CHANGE UP (ref 1.6–2.4)
MCHC RBC-ENTMCNC: 23 PG — LOW (ref 27–34)
MCHC RBC-ENTMCNC: 32.4 G/DL — SIGNIFICANT CHANGE UP (ref 32–36)
MCV RBC AUTO: 71.1 FL — LOW (ref 80–100)
PLATELET # BLD AUTO: 364 K/UL — SIGNIFICANT CHANGE UP (ref 150–400)
POTASSIUM SERPL-MCNC: 3.9 MMOL/L — SIGNIFICANT CHANGE UP (ref 3.5–5.3)
POTASSIUM SERPL-SCNC: 3.9 MMOL/L — SIGNIFICANT CHANGE UP (ref 3.5–5.3)
RBC # BLD: 3.43 M/UL — LOW (ref 3.8–5.2)
RBC # FLD: 17.9 % — HIGH (ref 10.3–16.9)
SODIUM SERPL-SCNC: 138 MMOL/L — SIGNIFICANT CHANGE UP (ref 135–145)
WBC # BLD: 14.2 K/UL — HIGH (ref 3.8–10.5)
WBC # FLD AUTO: 14.2 K/UL — HIGH (ref 3.8–10.5)

## 2017-01-31 PROCEDURE — 99233 SBSQ HOSP IP/OBS HIGH 50: CPT

## 2017-01-31 RX ORDER — NYSTATIN CREAM 100000 [USP'U]/G
1 CREAM TOPICAL
Qty: 0 | Refills: 0 | Status: DISCONTINUED | OUTPATIENT
Start: 2017-01-31 | End: 2017-02-09

## 2017-01-31 RX ORDER — SODIUM CHLORIDE 9 MG/ML
1000 INJECTION, SOLUTION INTRAVENOUS
Qty: 0 | Refills: 0 | Status: DISCONTINUED | OUTPATIENT
Start: 2017-01-31 | End: 2017-02-04

## 2017-01-31 RX ADMIN — Medication 2: at 22:17

## 2017-01-31 RX ADMIN — SODIUM CHLORIDE 80 MILLILITER(S): 9 INJECTION INTRAMUSCULAR; INTRAVENOUS; SUBCUTANEOUS at 06:50

## 2017-01-31 RX ADMIN — NYSTATIN CREAM 1 APPLICATION(S): 100000 CREAM TOPICAL at 18:57

## 2017-01-31 RX ADMIN — HEPARIN SODIUM 5000 UNIT(S): 5000 INJECTION INTRAVENOUS; SUBCUTANEOUS at 18:59

## 2017-01-31 RX ADMIN — HEPARIN SODIUM 5000 UNIT(S): 5000 INJECTION INTRAVENOUS; SUBCUTANEOUS at 06:51

## 2017-01-31 NOTE — PROGRESS NOTE ADULT - PROBLEM SELECTOR PLAN 1
Likely due to poor PO intake from end stage dementia and sedating medications.   S/p 1L bolus in the ED, patient still dry on exam, on start fluid at 75cc/hour.  Holding Trazadone, gabapentin, and lorantine due to sedation.  Speech and swallow consult on board  Palliative care had a discussion with the patient, despite the lack of improvement in survival patient is to get a PEG tube per family by Dr. Lucero

## 2017-01-31 NOTE — PROGRESS NOTE ADULT - SUBJECTIVE AND OBJECTIVE BOX
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 Per family, poor PO intake has been going on for many months, gradually decreasing. Per home health aid, patient had very little PO intake for past 2 days. Denied fever/chill, no nausea/vomiting, no sign of distress. About 5 years ago patient had a PEG tube, which improved her nutritional status tremendously so PEG tube was subsequently removed when patient started to tolerate PO again. Family members are hoping another PEG tube can be placed this time. Family member also reported various pressure ulcers on patient buttocks, sacral and lower extremities, which she was getting regular wound care at home by visiting nurse, however wounds are not healing as much lately. Denied worsening erythema or discharge from wounds.  In ED, vitals were stable, noted her cyanosis on her bilateral feet, vascular was consulted, no intervention at this time. She was given 1L bolus NS. CXR and UA were unremarkable. (29 Jan 2017 17:59)      Subjective:    DYSPNEA:  N	  NAUS/VOM: N	  SECRETIONS:  N	  AGITATION N  Pain (Y/N): N      -Provocation/Palliation:  -Quality/Quantity:  -Radiating:  -Severity:  -Timing/Frequency:  -Impact on ADLs:    OTHER REVIEW OF SYSTEMS:  UNABLE TO OBTAIN  due to: pt N/V, cannot follow commands    PEx:  T(C): 36.8, Max: 37.3 (01-31 @ 21:04)  HR: 95 (95 - 101)  BP: 164/79 (158/87 - 178/72)  RR: 19 (18 - 19)  SpO2: 98% (98% - 98%)  Wt(kg): --36.6    GENERAL:    HEENT:      	  NECK:           CVS:           	  RESP:        	  GI:             	  :            	  MUSC:       	  NEURO:     	  PSYCH:          SKIN:         	   LYMPH:      	     No Known Allergies      OPIATE NAÏVE (Y/N):  iSTOP REVIEWED (Y/N):     MEDICATIONS: REVIEWED  MEDICATIONS  (STANDING):  losartan 100milliGRAM(s) Oral daily  diltiazem    Tablet 180milliGRAM(s) Oral two times a day  atorvastatin 20milliGRAM(s) Oral at bedtime  folic acid 1milliGRAM(s) Oral daily  ascorbic acid 500milliGRAM(s) Oral daily  heparin  Injectable 5000Unit(s) SubCutaneous every 12 hours  insulin lispro (HumaLOG) corrective regimen sliding scale  SubCutaneous Before meals and at bedtime  dextrose 5% + sodium chloride 0.45%. 1000milliLiter(s) IV Continuous <Continuous>  nystatin Powder 1Application(s) Topical two times a day    MEDICATIONS  (PRN):  bisacodyl 5milliGRAM(s) Oral every 12 hours PRN Constipation      LABS: REVIEWED        IMAGING: REVIEWED    ADVANCED DIRECTIVES:     FULL CODE     DNR     DNI     LIVING WILL     MOLST    DECISION MAKER:   LEGAL SURROGATE:    PSYCHOSOCIAL-SPIRITUAL ASSESSMENT:       Reviewed       Care plan unchanged       Care plan adjusted as above	    GOALS OF CARE DISCUSSION       Palliative care info/counseling provided	           Family meeting       Advanced Directives addressed please see Advance Care Planning Note	           See previous Palliative Medicine Note       Documentation of GOC:   	      AGENCY CHOICE DISCUSSED:     HOMECARE  HOSPICE     [facility]     OTHER:    REFERRALS	        Palliative Med        Unit SW/Case Mgmt              Speech/Swallow       Patient/Family Support       Massage Therapy       Music Therapy       Hospice       Nutrition       Ethics       PT/OT        CRITICAL CARE TIME PROVIDED TO UNSTABLE PT W/ ORGAN FAILURE	   Start:               End:  	       Minutes:              > 50% OF THE TIME SPENT IN COUNSELING AND COORDINATING CARE 	   Start:               End:  	       Minutes:      PROLONGED SERVICE             FACE TO FACE:    PT            PT & FAMILY	   Start:               End:  	       Minutes:      Advance Care Planning Time: CHUCK LUCIO   MRN-[id number]    CC: anorexia, lethargy     HPI:  Patient is a 89 yo Female pmh of multiple CVAs many years ago, 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 Per family, poor PO intake has been going on for many months, gradually decreasing. Per home health aid, patient had very little PO intake for past 2 days. Denied fever/chill, no nausea/vomiting, no sign of distress. About 5 years ago patient had a PEG tube, which improved her nutritional status tremendously so PEG tube was subsequently removed when patient started to tolerate PO again. Family members are hoping another PEG tube can be placed this time. Family member also reported various pressure ulcers on patient buttocks, sacral and lower extremities, which she was getting regular wound care at home by visiting nurse, however wounds are not healing as much lately. Denied worsening erythema or discharge from wounds.  In ED, vitals were stable, noted her cyanosis on her bilateral feet, vascular was consulted, no intervention at this time. She was given 1L bolus NS. CXR and UA were unremarkable. (29 Jan 2017 17:59)      DYSPNEA:  N	  NAUS/VOM: N	  SECRETIONS:  N	  AGITATION N  Pain (Y/N): N      -Provocation/Palliation:  -Quality/Quantity:  -Radiating:  -Severity:  -Timing/Frequency:  -Impact on ADLs:    OTHER REVIEW OF SYSTEMS:  UNABLE TO OBTAIN  due to: pt N/V, cannot follow commands    PEx:  T(C): 36.8, Max: 37.3 (01-31 @ 21:04)  HR: 95 (95 - 101)  BP: 164/79 (158/87 - 178/72)  RR: 19 (18 - 19)  SpO2: 98% (98% - 98%)  Wt(kg): --36.6    Constitutional: elderly female in bed in NAD  HEENT: No scleral icterus. No Conjunctival Pallor, MM dry.   Neck : supple no JVD  Respiratory: CTABL   Cardiovascular: Normal S1 and S2, tachycardic no MRG   Gastrointestinal: BS normoactive. S/NT/ND.   Extremities: WWP. Cyanotic toes.B/L upper and lower extremity contractures   Neurological:lethargic, NV cannot follow commands  Psych: calm         No Known Allergies      OPIATE NAÏVE (Y/N):Y  iSTOP REVIEWED (Y/N): N    MEDICATIONS: REVIEWED  MEDICATIONS  (STANDING):  losartan 100milliGRAM(s) Oral daily  diltiazem    Tablet 180milliGRAM(s) Oral two times a day  atorvastatin 20milliGRAM(s) Oral at bedtime  folic acid 1milliGRAM(s) Oral daily  ascorbic acid 500milliGRAM(s) Oral daily  heparin  Injectable 5000Unit(s) SubCutaneous every 12 hours  insulin lispro (HumaLOG) corrective regimen sliding scale  SubCutaneous Before meals and at bedtime  dextrose 5% + sodium chloride 0.45%. 1000milliLiter(s) IV Continuous <Continuous>  nystatin Powder 1Application(s) Topical two times a day    MEDICATIONS  (PRN):  bisacodyl 5milliGRAM(s) Oral every 12 hours PRN Constipation    LABS: REVIEWED      IMAGING: REVIEWED    ADVANCED DIRECTIVES:     FULL CODE       DECISION MAKER: Raji Lucio (son)  LEGAL SURROGATE:    PSYCHOSOCIAL-SPIRITUAL ASSESSMENT:       Reviewed       Care plan unchanged       	    GOALS OF CARE DISCUSSION       Palliative care info/counseling provided	           Family meeting       Advanced Directives addressed please see Advance Care Planning Note	             	      AGENCY CHOICE DISCUSSED:     HOMECARE  HOSPICE [facility]      REFERRALS	        Palliative Med        Unit SW/Case Mgmt              Speech/Swallow        Hospice       Nutrition           CRITICAL CARE TIME PROVIDED TO UNSTABLE PT W/ ORGAN FAILURE	   Start:               End:  	       Minutes:              > 50% OF THE TIME SPENT IN COUNSELING AND COORDINATING CARE 	   Start:  9:30 AM             End:10:00AM    Minutes:30      PROLONGED SERVICE             FACE TO FACE:    PT            PT & FAMILY	   Start:               End:  	       Minutes:      Advance Care Planning Time:

## 2017-01-31 NOTE — PROGRESS NOTE ADULT - SUBJECTIVE AND OBJECTIVE BOX
INTERVAL HPI/OVERNIGHT EVENTS: TITI. Patient is without complaints.     ICU Vital Signs Last 24 Hrs  T(C): 36.7, Max: 37.1 ( @ 17:27)  T(F): 98, Max: 98.8 ( @ 17:27)  HR: 98 (89 - 98)  BP: 178/72 (147/77 - 178/72)  BP(mean): --  ABP: --  ABP(mean): --  RR: 18 (18 - 18)  SpO2: 98% (96% - 98%)    I&O's Summary  I & Os for 24h ending 2017 07:00  =============================================  IN: 800 ml / OUT: 1700 ml / NET: -900 ml    I & Os for current day (as of 2017 10:47)  =============================================  IN: 0 ml / OUT: 950 ml / NET: -950 ml    PHYSICAL EXAM:    Constitutional: NAD. Contracted.   Eyes: No scleral icterus. No Conj Pallor.   ENMT: MMM. Neck   Neck: No JVD  Respiratory: CTABL   Cardiovascular: Normal S1 and S2 no MRG   Gastrointestinal: BS normoactive. S/NT/ND.   Extremities: WWP. Cyanotic toes.   Neurological: AAOX3. CN II- XII intact. Reflexes 3 plus. Strength 5/5 upper and lower. No Dysmetria.      MEDICATIONS  (STANDING):  losartan 100milliGRAM(s) Oral daily  diltiazem    Tablet 180milliGRAM(s) Oral two times a day  atorvastatin 20milliGRAM(s) Oral at bedtime  folic acid 1milliGRAM(s) Oral daily  ascorbic acid 500milliGRAM(s) Oral daily  heparin  Injectable 5000Unit(s) SubCutaneous every 12 hours  insulin lispro (HumaLOG) corrective regimen sliding scale  SubCutaneous Before meals and at bedtime  sodium chloride 0.9%. 1000milliLiter(s) IV Continuous <Continuous>    MEDICATIONS  (PRN):  bisacodyl 5milliGRAM(s) Oral every 12 hours PRN Constipation      LABS:                        7.9    14.2  )-----------( 364      ( 2017 07:27 )             24.4     2017 07:27    138    |  108    |  15     ----------------------------<  73     3.9     |  18     |  0.29     Ca    8.0        2017 07:27  Mg     1.9       2017 07:27    TPro  x      /  Alb  2.1    /  TBili  x      /  DBili  x      /  AST  x      /  ALT  x      /  AlkPhos  x      2017 08:38    PT/INR - ( 2017 13:45 )   PT: 11.9 sec;   INR: 1.07          PTT - ( 2017 13:45 )  PTT:25.9 sec  Urinalysis Basic - ( 2017 15:23 )    Color: Yellow / Appearance: SL CLOUDY / S.010 / pH: x  Gluc: x / Ketone: NEGATIVE  / Bili: NEGATIVE / Urobili: 0.2 E.U./dL   Blood: x / Protein: Trace mg/dL / Nitrite: NEGATIVE   Leuk Esterase: NEGATIVE / RBC: < 5 /HPF / WBC < 5 /HPF   Sq Epi: x / Non Sq Epi: Rare /HPF / Bacteria: Present /HPF      I&O's Summary  I & Os for 24h ending 2017 07:00  =============================================  IN: 800 ml / OUT: 1700 ml / NET: -900 ml    I & Os for current day (as of 2017 10:47)  =============================================  IN: 0 ml / OUT: 950 ml / NET: -950 ml    RADIOLOGY & ADDITIONAL TESTS: INTERVAL HPI/OVERNIGHT EVENTS: TITI. Patient is without complaints.     ICU Vital Signs Last 24 Hrs  T(C): 36.7, Max: 37.1 ( @ 17:27)  T(F): 98, Max: 98.8 ( @ 17:27)  HR: 98 (89 - 98)  BP: 178/72 (147/77 - 178/72)  BP(mean): --  ABP: --  ABP(mean): --  RR: 18 (18 - 18)  SpO2: 98% (96% - 98%)    I&O's Summary  I & Os for 24h ending 2017 07:00  =============================================  IN: 800 ml / OUT: 1700 ml / NET: -900 ml    I & Os for current day (as of 2017 10:47)  =============================================  IN: 0 ml / OUT: 950 ml / NET: -950 ml    PHYSICAL EXAM:    Constitutional: NAD. Contracted.   Eyes: No scleral icterus. No Conj Pallor.   ENMT: MMM. Neck   Neck: No JVD  Respiratory: CTABL   Cardiovascular: Normal S1 and S2 no MRG   Gastrointestinal: BS normoactive. S/NT/ND.   Extremities: WWP. Cyanotic toes. Functional Parapalegia.   Neurological: AAOX3. CN II- XII intact. Reflexes 3 plus. Strength 5/5 upper and lower. No Dysmetria.      MEDICATIONS  (STANDING):  losartan 100milliGRAM(s) Oral daily  diltiazem    Tablet 180milliGRAM(s) Oral two times a day  atorvastatin 20milliGRAM(s) Oral at bedtime  folic acid 1milliGRAM(s) Oral daily  ascorbic acid 500milliGRAM(s) Oral daily  heparin  Injectable 5000Unit(s) SubCutaneous every 12 hours  insulin lispro (HumaLOG) corrective regimen sliding scale  SubCutaneous Before meals and at bedtime  sodium chloride 0.9%. 1000milliLiter(s) IV Continuous <Continuous>    MEDICATIONS  (PRN):  bisacodyl 5milliGRAM(s) Oral every 12 hours PRN Constipation      LABS:                        7.9    14.2  )-----------( 364      ( 2017 07:27 )             24.4     2017 07:27    138    |  108    |  15     ----------------------------<  73     3.9     |  18     |  0.29     Ca    8.0        2017 07:27  Mg     1.9       2017 07:27    TPro  x      /  Alb  2.1    /  TBili  x      /  DBili  x      /  AST  x      /  ALT  x      /  AlkPhos  x      2017 08:38    PT/INR - ( 2017 13:45 )   PT: 11.9 sec;   INR: 1.07          PTT - ( 2017 13:45 )  PTT:25.9 sec  Urinalysis Basic - ( 2017 15:23 )    Color: Yellow / Appearance: SL CLOUDY / S.010 / pH: x  Gluc: x / Ketone: NEGATIVE  / Bili: NEGATIVE / Urobili: 0.2 E.U./dL   Blood: x / Protein: Trace mg/dL / Nitrite: NEGATIVE   Leuk Esterase: NEGATIVE / RBC: < 5 /HPF / WBC < 5 /HPF   Sq Epi: x / Non Sq Epi: Rare /HPF / Bacteria: Present /HPF      I&O's Summary  I & Os for 24h ending 2017 07:00  =============================================  IN: 800 ml / OUT: 1700 ml / NET: -900 ml    I & Os for current day (as of 2017 10:47)  =============================================  IN: 0 ml / OUT: 950 ml / NET: -950 ml    RADIOLOGY & ADDITIONAL TESTS:

## 2017-01-31 NOTE — ADVANCED PRACTICE NURSE CONSULT - RECOMMEDATIONS
Cleanse with wound cleanser.  Sacral pressure injury - pack lightly with Aquacel AG, cover with foam dressing every other day and prn if soiled or wet.   Fungal rash - apply nystatin powder twice daily and cover with liquid skin barrier.  Bilateral posterior knees, LLE lateral aspect, left buttocks and left trochanter - apply foam dressing every other day and prn if soiled or wet.   Use z pritesh pillow for repositioning and offload heels.   Discussed assessment and recommendations with Marco and house staff.

## 2017-01-31 NOTE — ADVANCED PRACTICE NURSE CONSULT - REASON FOR CONSULT
[facility] nurse consult for 89 yo Female PMH of multiple CVAs many years ago, 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 Per family, poor PO intake has been going on for many months, gradually decreasing. Per home health aid, patient had very little PO intake for past 2 days.

## 2017-01-31 NOTE — PROGRESS NOTE ADULT - PROBLEM SELECTOR PLAN 4
Recommendation: Pt with progressive dysphagia likely secondary to worsening mental status in setting of severe dementia and advanced illness. Family is requesting PEG after discussion with their primary care doctor in the community as well as their Rabbi. Extensive discussion had at the bedside regarding risks/benefits of PEG tube feeding. We had a thanh discussion that PEG tube feed may not provide enough nutritional support to reach family's goal of wound healing. Family understands the risks and potential outcomes of PEG tube feeds, and would like to proceed as long as it remains an option.

## 2017-01-31 NOTE — ADVANCED PRACTICE NURSE CONSULT - ASSESSMENT
Patient non-verbal, presented with UE/LE contractures and multiple pressure ulcers (injuries) on admission. Left foot dry gangrene assessed by vascular team.   Sacral stage 4 pressure injury with 50% yellow slough and 50% pale/pink non granulating tissue, undermining around circumference of wound with deepest point measuring 2 cm at 7 o'clock. Wound previously measured by RN. Moderate amount of serosanguinous exudate noted. Periwound and bilateral buttocks fungal rash also noted.   Right posterior knee 2 unstageable  pressure injuries, measuring 3 cm x 3 cm and 2 cm x 1.5 cm with small amount of serosanguinous exudate.   Left posterior knee stage 4 pressure injury with pale pink non granulation tissue. Wound previously measured by RN. Small amount of serosanguinous exudate noted.   LLE lateral aspect unstageable pressure injury measuring 8 cm x 3.5 cm with moderate amount of serosanguinous exudate.   Left buttocks unstageable pressure injury measuring 0.5 cm x 0.5 cm with small amount of serosanguinous exudate.  Left trochanter stage 1 pressure injury measuring 0.5 cm x 0.5 cm.   Marco TABARES, present during assessment.

## 2017-01-31 NOTE — PROGRESS NOTE ADULT - PROBLEM SELECTOR PLAN 1
Severe dementia likely secondary to multiple prior CVAs, has been bedbound and nonverbal for many years. FAST 7E.

## 2017-01-31 NOTE — PROGRESS NOTE ADULT - PROBLEM SELECTOR PLAN 6
Met with pt son Augustine at pt bedside. Rabbi John Naranjo of [facility] hospice present during visit to discuss possible home hospice services on d/c No decision made as of yet. He will discuss with family

## 2017-01-31 NOTE — PROGRESS NOTE ADULT - PROBLEM SELECTOR PLAN 3
Pt with multiple non-healing decubitus ulcers in setting of poor nutritional status. Is receiving aggressive wound care in the home provided primarily by family members, but may benefit from more services in the home. Discussed [facility] home hospice with family and will make referral for hospice services tomorrow.

## 2017-01-31 NOTE — PROGRESS NOTE ADULT - PROBLEM SELECTOR PLAN 2
Pt with progressively worsening PO intake over course of last few weeks to months without easily identifiable cause, and likely represents a natural progression of her underlying disease process and advanced age.

## 2017-01-31 NOTE — PROGRESS NOTE ADULT - SUBJECTIVE AND OBJECTIVE BOX
Pt seen and examined   no change but looks better hydrated    REVIEW OF SYSTEMS:  Constitutional: No fever, otherwise patient cannot give ROS      MEDICATIONS:  MEDICATIONS  (STANDING):  losartan 100milliGRAM(s) Oral daily  diltiazem    Tablet 180milliGRAM(s) Oral two times a day  atorvastatin 20milliGRAM(s) Oral at bedtime  folic acid 1milliGRAM(s) Oral daily  ascorbic acid 500milliGRAM(s) Oral daily  heparin  Injectable 5000Unit(s) SubCutaneous every 12 hours  insulin lispro (HumaLOG) corrective regimen sliding scale  SubCutaneous Before meals and at bedtime  sodium chloride 0.9%. 1000milliLiter(s) IV Continuous <Continuous>    MEDICATIONS  (PRN):  bisacodyl 5milliGRAM(s) Oral every 12 hours PRN Constipation      Allergies    No Known Allergies    Intolerances        Vital Signs Last 24 Hrs  T(C): 36.7, Max: 37.1 ( @ 17:27)  T(F): 98, Max: 98.8 ( @ 17:27)  HR: 98 (89 - 98)  BP: 178/72 (147/77 - 178/72)  BP(mean): --  RR: 18 (18 - 18)  SpO2: 98% (96% - 98%)  I & Os for 24h ending  @ 07:00  =============================================  IN: 800 ml / OUT: 1700 ml / NET: -900 ml    I & Os for current day (as of  @ 10:25)  =============================================  IN: 0 ml / OUT: 950 ml / NET: -950 ml      PHYSICAL EXAM:    General: thin, NAD  HEENT: MMM, conjunctiva and sclera clear  Lungs: poor inspiratory effort  Heart: regular but BP up  Gastrointestinal: Soft non-tender non-distended; Normal bowel sounds; No hepatosplenomegaly  Skin: Warm and dry. No obvious rash  Ext: no change  Left foot gangene    LABS:      CBC Full  -  ( 2017 07:27 )  WBC Count : 14.2 K/uL  Hemoglobin : 7.9 g/dL  Hematocrit : 24.4 %  Platelet Count - Automated : 364 K/uL  Mean Cell Volume : 71.1 fL  Mean Cell Hemoglobin : 23.0 pg  Mean Cell Hemoglobin Concentration : 32.4 g/dL  Auto Neutrophil # : x  Auto Lymphocyte # : x  Auto Monocyte # : x  Auto Eosinophil # : x  Auto Basophil # : x  Auto Neutrophil % : x  Auto Lymphocyte % : x  Auto Monocyte % : x  Auto Eosinophil % : x  Auto Basophil % : x    2017 07:27    138    |  108    |  15     ----------------------------<  73     3.9     |  18     |  0.29     Ca    8.0        2017 07:27  Mg     1.9       2017 07:27    TPro  x      /  Alb  2.1    /  TBili  x      /  DBili  x      /  AST  x      /  ALT  x      /  AlkPhos  x      2017 08:38    PT/INR - ( 2017 13:45 )   PT: 11.9 sec;   INR: 1.07          PTT - ( 2017 13:45 )  PTT:25.9 sec      Urinalysis Basic - ( 2017 15:23 )    Color: Yellow / Appearance: SL CLOUDY / S.010 / pH: x  Gluc: x / Ketone: NEGATIVE  / Bili: NEGATIVE / Urobili: 0.2 E.U./dL   Blood: x / Protein: Trace mg/dL / Nitrite: NEGATIVE   Leuk Esterase: NEGATIVE / RBC: < 5 /HPF / WBC < 5 /HPF   Sq Epi: x / Non Sq Epi: Rare /HPF / Bacteria: Present /HPF                RADIOLOGY & ADDITIONAL STUDIES (The following images were personally reviewed):

## 2017-02-01 DIAGNOSIS — D72.829 ELEVATED WHITE BLOOD CELL COUNT, UNSPECIFIED: ICD-10-CM

## 2017-02-01 DIAGNOSIS — L89.153 PRESSURE ULCER OF SACRAL REGION, STAGE 3: ICD-10-CM

## 2017-02-01 DIAGNOSIS — D72.825 BANDEMIA: ICD-10-CM

## 2017-02-01 LAB
ANION GAP SERPL CALC-SCNC: 12 MMOL/L — SIGNIFICANT CHANGE UP (ref 9–16)
BUN SERPL-MCNC: 13 MG/DL — SIGNIFICANT CHANGE UP (ref 7–23)
CALCIUM SERPL-MCNC: 7.7 MG/DL — LOW (ref 8.5–10.5)
CHLORIDE SERPL-SCNC: 107 MMOL/L — SIGNIFICANT CHANGE UP (ref 96–108)
CO2 SERPL-SCNC: 19 MMOL/L — LOW (ref 22–31)
CREAT SERPL-MCNC: 0.26 MG/DL — LOW (ref 0.5–1.3)
CULTURE RESULTS: NO GROWTH — SIGNIFICANT CHANGE UP
GLUCOSE SERPL-MCNC: 148 MG/DL — HIGH (ref 70–99)
HCT VFR BLD CALC: 25.2 % — LOW (ref 34.5–45)
HGB BLD-MCNC: 8.2 G/DL — LOW (ref 11.5–15.5)
MAGNESIUM SERPL-MCNC: 1.7 MG/DL — SIGNIFICANT CHANGE UP (ref 1.6–2.4)
MCHC RBC-ENTMCNC: 23 PG — LOW (ref 27–34)
MCHC RBC-ENTMCNC: 32.5 G/DL — SIGNIFICANT CHANGE UP (ref 32–36)
MCV RBC AUTO: 70.8 FL — LOW (ref 80–100)
PLATELET # BLD AUTO: 356 K/UL — SIGNIFICANT CHANGE UP (ref 150–400)
POTASSIUM SERPL-MCNC: 3.3 MMOL/L — LOW (ref 3.5–5.3)
POTASSIUM SERPL-SCNC: 3.3 MMOL/L — LOW (ref 3.5–5.3)
RBC # BLD: 3.56 M/UL — LOW (ref 3.8–5.2)
RBC # FLD: 18 % — HIGH (ref 10.3–16.9)
SODIUM SERPL-SCNC: 138 MMOL/L — SIGNIFICANT CHANGE UP (ref 135–145)
SPECIMEN SOURCE: SIGNIFICANT CHANGE UP
WBC # BLD: 16.8 K/UL — HIGH (ref 3.8–10.5)
WBC # FLD AUTO: 16.8 K/UL — HIGH (ref 3.8–10.5)

## 2017-02-01 PROCEDURE — 99233 SBSQ HOSP IP/OBS HIGH 50: CPT

## 2017-02-01 RX ORDER — POTASSIUM CHLORIDE 20 MEQ
10 PACKET (EA) ORAL
Qty: 0 | Refills: 0 | Status: COMPLETED | OUTPATIENT
Start: 2017-02-01 | End: 2017-02-01

## 2017-02-01 RX ADMIN — HEPARIN SODIUM 5000 UNIT(S): 5000 INJECTION INTRAVENOUS; SUBCUTANEOUS at 06:50

## 2017-02-01 RX ADMIN — NYSTATIN CREAM 1 APPLICATION(S): 100000 CREAM TOPICAL at 19:38

## 2017-02-01 RX ADMIN — Medication 100 MILLIEQUIVALENT(S): at 13:13

## 2017-02-01 RX ADMIN — Medication 100 MILLIEQUIVALENT(S): at 12:04

## 2017-02-01 RX ADMIN — NYSTATIN CREAM 1 APPLICATION(S): 100000 CREAM TOPICAL at 06:51

## 2017-02-01 RX ADMIN — Medication 100 MILLIEQUIVALENT(S): at 14:31

## 2017-02-01 RX ADMIN — HEPARIN SODIUM 5000 UNIT(S): 5000 INJECTION INTRAVENOUS; SUBCUTANEOUS at 17:58

## 2017-02-01 NOTE — PROGRESS NOTE ADULT - PROBLEM SELECTOR PLAN 7
Met with pt son Augustine at pt bedside. States he has given info re: hospice to his MIKO Madeleine who wishes further discussion wit Palliative NP. Plan to meet with her later today to answer questions Further discussion initiated by Augustine re: burden vs benefit of cardiac compressions and ventilatory support. He is not decision maker but wishes to relay info to his family

## 2017-02-01 NOTE — PROGRESS NOTE ADULT - SUBJECTIVE AND OBJECTIVE BOX
CHUCK LUCIO   MRN-[id number]    CC:anorexia, leucocytosis     HPI:  Patient is a 89 yo Female pmh of multiple CVAs many years ago, 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 Per family, poor PO intake has been going on for many months, gradually decreasing. Per home health aid, patient had very little PO intake for past 2 days. Denied fever/chill, no nausea/vomiting, no sign of distress. About 5 years ago patient had a PEG tube, which improved her nutritional status tremendously so PEG tube was subsequently removed when patient started to tolerate PO again. Family members are hoping another PEG tube can be placed this time. Family member also reported various pressure ulcers on patient buttocks, sacral and lower extremities, which she was getting regular wound care at home by visiting nurse, however wounds are not healing as much lately. Denied worsening erythema or discharge from wounds.  In ED, vitals were stable, noted her cyanosis on her bilateral feet, vascular was consulted, no intervention at this time. She was given 1L bolus NS. CXR and UA were unremarkable. (29 Jan 2017 17:59)      Subjective:    DYSPNEA: N	  NAUS/VOM:  N	  SECRETIONS:  N	  AGITATION:  N  Pain (Y/N):   NO    -Provocation/Palliation:  -Quality/Quantity:  -Radiating:  -Severity:  -Timing/Frequency:  -Impact on ADLs:    OTHER REVIEW OF SYSTEMS:  UNABLE TO OBTAIN  due to: pt nonverbal, minimally responsive    PEx:  T(C): 37.2, Max: 37.4 (02-01 @ 11:18)  HR: 94 (94 - 101)  BP: 166/77 (158/87 - 179/81)  RR: 20 (18 - 22)  SpO2: 98% (98% - 99%)  Wt(kg): --    onstitutional: elderly female in bed in NAD  HEENT: No scleral icterus. No Conjunctival Pallor, MM dry.   Neck : supple no JVD  Respiratory: CTABL   Cardiovascular: Normal S1 and S2, tachycardic no MRG   Gastrointestinal: BS normoactive. S/NT/ND.   Extremities: WWP. Cyanotic toes.B/L upper and lower extremity contractures   Neurological:lethargic, NV cannot follow commands  Psych: calm   Lymph: no LAD    No Known Allergies      OPIATE NAÏVE (Y/N):Yes  iSTOP REVIEWED (Y/N):No     MEDICATIONS: REVIEWED  MEDICATIONS  (STANDING):  atorvastatin 20milliGRAM(s) Oral at bedtime  folic acid 1milliGRAM(s) Oral daily  ascorbic acid 500milliGRAM(s) Oral daily  heparin  Injectable 5000Unit(s) SubCutaneous every 12 hours  dextrose 5% + sodium chloride 0.45%. 1000milliLiter(s) IV Continuous <Continuous>  nystatin Powder 1Application(s) Topical two times a day    MEDICATIONS  (PRN):  bisacodyl 5milliGRAM(s) Oral every 12 hours PRN Constipation      LABS: REVIEWED    IMAGING: REVIEWED    ADVANCED DIRECTIVES:     FULL CODE        DECISION MAKER: Raji Lucio (son)  LEGAL SURROGATE:    PSYCHOSOCIAL-SPIRITUAL ASSESSMENT:       Reviewed       Care plan unchanged      	    GOALS OF CARE DISCUSSION       Palliative care info/counseling provided	           See previous Palliative Medicine Note       Documentation of GOC: Full Code   	      AGENCY CHOICE DISCUSSED:     HOMECARE  HOSPICE       REFERRALS	        Palliative Med        Unit SW/Case Mgmt              Speech/Swallow        Hospice- [facility]       Nutrition            CRITICAL CARE TIME PROVIDED TO UNSTABLE PT W/ ORGAN FAILURE	   Start:               End:  	       Minutes:              > 50% OF THE TIME SPENT IN COUNSELING AND COORDINATING CARE 	   Start:               End:  	       Minutes:      PROLONGED SERVICE             FACE TO FACE:    PT            PT & FAMILY	   Start:               End:  	       Minutes:      Advance Care Planning Time:

## 2017-02-01 NOTE — PROGRESS NOTE ADULT - SUBJECTIVE AND OBJECTIVE BOX
Pt seen and examined no change  son by bedside    REVIEW OF SYSTEMS:  unable to give      MEDICATIONS:  MEDICATIONS  (STANDING):  losartan 100milliGRAM(s) Oral daily  diltiazem    Tablet 180milliGRAM(s) Oral two times a day  atorvastatin 20milliGRAM(s) Oral at bedtime  folic acid 1milliGRAM(s) Oral daily  ascorbic acid 500milliGRAM(s) Oral daily  heparin  Injectable 5000Unit(s) SubCutaneous every 12 hours  dextrose 5% + sodium chloride 0.45%. 1000milliLiter(s) IV Continuous <Continuous>  nystatin Powder 1Application(s) Topical two times a day  potassium chloride  10 mEq/100 mL IVPB 10milliEquivalent(s) IV Intermittent every 1 hour    MEDICATIONS  (PRN):  bisacodyl 5milliGRAM(s) Oral every 12 hours PRN Constipation      Allergies    No Known Allergies    Intolerances        Vital Signs Last 24 Hrs  T(C): 36.8, Max: 37.3 (01-31 @ 21:04)  T(F): 98.3, Max: 99.1 (01-31 @ 21:04)  HR: 95 (95 - 101)  BP: 164/79 (158/87 - 169/79)  BP(mean): --  RR: 19 (18 - 19)  SpO2: 98% (98% - 98%)  I & Os for 24h ending 02-01 @ 07:00  =============================================  IN: 0 ml / OUT: 1900 ml / NET: -1900 ml    I & Os for current day (as of 02-01 @ 10:31)  =============================================  IN: 160 ml / OUT: 0 ml / NET: 160 ml      PHYSICAL EXAM:    General: in no acute distress  HEENT: MMM, conjunctiva and sclera clear  Lungs: poor inspiratory effort  Heart: regular  Gastrointestinal: Soft non-tender non-distended; Normal bowel sounds; No hepatosplenomegaly  Skin: Warm and dry. No obvious rash  Ext: gangrenous foot    LABS:      CBC Full  -  ( 01 Feb 2017 07:35 )  WBC Count : 16.8 K/uL  Hemoglobin : 8.2 g/dL  Hematocrit : 25.2 %  Platelet Count - Automated : 356 K/uL  Mean Cell Volume : 70.8 fL  Mean Cell Hemoglobin : 23.0 pg  Mean Cell Hemoglobin Concentration : 32.5 g/dL  Auto Neutrophil # : x  Auto Lymphocyte # : x  Auto Monocyte # : x  Auto Eosinophil # : x  Auto Basophil # : x  Auto Neutrophil % : x  Auto Lymphocyte % : x  Auto Monocyte % : x  Auto Eosinophil % : x  Auto Basophil % : x    01 Feb 2017 07:35    138    |  107    |  13     ----------------------------<  148    3.3     |  19     |  0.26     Ca    7.7        01 Feb 2017 07:35  Mg     1.7       01 Feb 2017 07:35                        RADIOLOGY & ADDITIONAL STUDIES (The following images were personally reviewed):

## 2017-02-01 NOTE — PROGRESS NOTE ADULT - PROBLEM SELECTOR PLAN 5
Recommendation: Pt with progressive dysphagia likely secondary to worsening mental status in setting of severe dementia and advanced illness. Family is requesting PEG after discussion with their primary care doctor in the community as well as their Rabbi. Extensive discussion had at the bedside regarding risks/benefits of PEG tube feeding. We had a thanh discussion that PEG tube feed may not provide enough nutritional support to reach family's goal of wound healing. Family understands the risks and potential outcomes of PEG tube feeds, and would like to proceed as long as it remains an option. Presently PGT placement on hold 2/2 leukocytosis

## 2017-02-01 NOTE — PROGRESS NOTE ADULT - PROBLEM SELECTOR PLAN 2
WBC elevation UNCLEAR ETIOLOGY,  no sign of aspiration PNA on CXR, UA clear and decubitus ulcer appear non-infectious. Vital signs stable.  Will hold abx for now.  Wound consult for decubitus ulcers.

## 2017-02-01 NOTE — PROGRESS NOTE ADULT - SUBJECTIVE AND OBJECTIVE BOX
INTERVAL HPI/OVERNIGHT EVENTS: NAD.     ICU Vital Signs Last 24 Hrs  T(C): 36.8, Max: 37.3 (01-31 @ 21:04)  T(F): 98.3, Max: 99.1 (01-31 @ 21:04)  HR: 95 (95 - 101)  BP: 164/79 (158/87 - 178/72)  BP(mean): --  ABP: --  ABP(mean): --  RR: 19 (18 - 19)  SpO2: 98% (98% - 98%)    I&O's Summary    I & Os for current day (as of 01 Feb 2017 08:50)  =============================================  IN: 0 ml / OUT: 1900 ml / NET: -1900 ml    PHYSICAL EXAM:    Constitutional: NAD. Well Appearing.   Eyes: No scleral icterus. No Conj Pallor.   ENMT: MMM. Neck   Neck: No JVD  Respiratory: CTABL   Cardiovascular: Normal S1 and S2 no MRG   Gastrointestinal: BS normoactive. S/NT/ND.   Extremities: fXNAL PARAPALEGIA   Neurological: AAOXO. CN II- XII intact. Reflexes 3 plus. Strength 5/5 upper and lower. No Dysmetria.      MEDICATIONS  (STANDING):  losartan 100milliGRAM(s) Oral daily  diltiazem    Tablet 180milliGRAM(s) Oral two times a day  atorvastatin 20milliGRAM(s) Oral at bedtime  folic acid 1milliGRAM(s) Oral daily  ascorbic acid 500milliGRAM(s) Oral daily  heparin  Injectable 5000Unit(s) SubCutaneous every 12 hours  insulin lispro (HumaLOG) corrective regimen sliding scale  SubCutaneous Before meals and at bedtime  dextrose 5% + sodium chloride 0.45%. 1000milliLiter(s) IV Continuous <Continuous>  nystatin Powder 1Application(s) Topical two times a day    MEDICATIONS  (PRN):  bisacodyl 5milliGRAM(s) Oral every 12 hours PRN Constipation      LABS:                        8.2    16.8  )-----------( 356      ( 01 Feb 2017 07:35 )             25.2     01 Feb 2017 07:35    138    |  107    |  13     ----------------------------<  148    3.3     |  19     |  0.26     Ca    7.7        01 Feb 2017 07:35  Mg     1.7       01 Feb 2017 07:35          I&O's Summary    I & Os for current day (as of 01 Feb 2017 08:50)  =============================================  IN: 0 ml / OUT: 1900 ml / NET: -1900 ml    RADIOLOGY & ADDITIONAL TESTS:

## 2017-02-01 NOTE — CONSULT NOTE ADULT - SUBJECTIVE AND OBJECTIVE BOX
HPI:  Patient is a 89 yo Female pmh of multiple CVAs many years ago, 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 Per family, poor PO intake has been going on for many months, gradually decreasing. Per home health aid, patient had very little PO intake for past 2 days. Denied fever/chill, no nausea/vomiting, no sign of distress. About 5 years ago patient had a PEG tube, which improved her nutritional status tremendously so PEG tube was subsequently removed when patient started to tolerate PO again. Family members are hoping another PEG tube can be placed this time. Family member also reported various pressure ulcers on patient buttocks, sacral and lower extremities, which she was getting regular wound care at home by visiting nurse, however wounds are not healing as much lately. Denied worsening erythema or discharge from wounds.  In ED, vitals were stable, noted her cyanosis on her bilateral feet, vascular was consulted, no intervention at this time. She was given 1L bolus NS. CXR and UA were unremarkable. (29 Jan 2017 17:59)      PAST MEDICAL & SURGICAL HISTORY:  Hypercholesteremia  Diabetes  CVA (cerebral vascular accident)  HTN (hypertension)  No significant past surgical history      REVIEW OF SYSTEMS    patient uncommunicative    MEDICATIONS  (STANDING):  atorvastatin 20milliGRAM(s) Oral at bedtime  folic acid 1milliGRAM(s) Oral daily  ascorbic acid 500milliGRAM(s) Oral daily  heparin  Injectable 5000Unit(s) SubCutaneous every 12 hours  dextrose 5% + sodium chloride 0.45%. 1000milliLiter(s) IV Continuous <Continuous>  nystatin Powder 1Application(s) Topical two times a day    MEDICATIONS  (PRN):  bisacodyl 5milliGRAM(s) Oral every 12 hours PRN Constipation          Allergies    No Known Allergies    Intolerances        SOCIAL HISTORY:    FAMILY HISTORY:  No pertinent family history in first degree relatives      Vital Signs Last 24 Hrs  T(C): 37.2, Max: 37.4 (02-01 @ 11:18)  T(F): 98.9, Max: 99.3 (02-01 @ 11:18)  HR: 94 (94 - 101)  BP: 166/77 (158/87 - 179/81)  BP(mean): --  RR: 20 (18 - 22)  SpO2: 98% (98% - 99%)    PHYSICAL EXAM:    cachectic  Eyes: PERRL, EOM intact; conjunctiva and sclera clear  Head: Normocephalic; atraumatic  ENMT: No nasal discharge; airway clear  Neck: Supple; non tender; no masses  Respiratory: No wheezes, rales or rhonchi  Cardiovascular: Regular rate and rhythm. S1 and S2 Normal; No murmurs, gallops or rubs  Gastrointestinal: Soft non-tender non-distended; Normal bowel sounds; No hepatosplenomegaly  Genitourinary: No costovertebral angle tenderness  Extremities gangrene of foot (dry)    patient with sacral decubitus  not infected  Vascular: Peripheral pulses palpable 2+ bilaterally  Neurological: Alert and oriented x3  Skin: Warm and dry. No acute rash  Lymph Nodes: No acute cervical adenopathy  Musculoskeletal: Normal gait, tone, without deformities    LABS:                        8.2    16.8  )-----------( 356      ( 01 Feb 2017 07:35 )             25.2     01 Feb 2017 07:35    138    |  107    |  13     ----------------------------<  148    3.3     |  19     |  0.26     Ca    7.7        01 Feb 2017 07:35  Mg     1.7       01 Feb 2017 07:35          Culture Results:   No growth (01-30 @ 06:43)  Culture Results:   No growth at 2 days. (01-29 @ 18:45)  Culture Results:   No growth at 2 days. (01-29 @ 18:45)    RADIOLOGY & ADDITIONAL STUDIES:

## 2017-02-01 NOTE — PROGRESS NOTE ADULT - PROBLEM SELECTOR PLAN 4
Pt with multiple non-healing decubitus ulcers in setting of poor nutritional status. Is receiving aggressive wound care in the home provided primarily by family members, but may benefit from more services in the home. Discussed [facility] home hospice with family and will make referral for hospice services tomorrow.

## 2017-02-02 DIAGNOSIS — I48.91 UNSPECIFIED ATRIAL FIBRILLATION: ICD-10-CM

## 2017-02-02 DIAGNOSIS — R06.00 DYSPNEA, UNSPECIFIED: ICD-10-CM

## 2017-02-02 LAB
ALBUMIN SERPL ELPH-MCNC: 1.8 G/DL — LOW (ref 3.4–5)
ALP SERPL-CCNC: 68 U/L — SIGNIFICANT CHANGE UP (ref 40–120)
ALT FLD-CCNC: 17 U/L — SIGNIFICANT CHANGE UP (ref 12–42)
ANION GAP SERPL CALC-SCNC: 11 MMOL/L — SIGNIFICANT CHANGE UP (ref 9–16)
ANION GAP SERPL CALC-SCNC: 9 MMOL/L — SIGNIFICANT CHANGE UP (ref 9–16)
APTT BLD: 29 SEC — SIGNIFICANT CHANGE UP (ref 27.5–37.4)
AST SERPL-CCNC: 20 U/L — SIGNIFICANT CHANGE UP (ref 15–37)
BILIRUB SERPL-MCNC: 0.4 MG/DL — SIGNIFICANT CHANGE UP (ref 0.2–1.2)
BUN SERPL-MCNC: 10 MG/DL — SIGNIFICANT CHANGE UP (ref 7–23)
BUN SERPL-MCNC: 9 MG/DL — SIGNIFICANT CHANGE UP (ref 7–23)
CALCIUM SERPL-MCNC: 7.8 MG/DL — LOW (ref 8.5–10.5)
CALCIUM SERPL-MCNC: 8.1 MG/DL — LOW (ref 8.5–10.5)
CHLORIDE SERPL-SCNC: 105 MMOL/L — SIGNIFICANT CHANGE UP (ref 96–108)
CHLORIDE SERPL-SCNC: 106 MMOL/L — SIGNIFICANT CHANGE UP (ref 96–108)
CO2 SERPL-SCNC: 19 MMOL/L — LOW (ref 22–31)
CO2 SERPL-SCNC: 19 MMOL/L — LOW (ref 22–31)
CREAT SERPL-MCNC: 0.29 MG/DL — LOW (ref 0.5–1.3)
CREAT SERPL-MCNC: 0.35 MG/DL — LOW (ref 0.5–1.3)
GLUCOSE SERPL-MCNC: 118 MG/DL — HIGH (ref 70–99)
GLUCOSE SERPL-MCNC: 143 MG/DL — HIGH (ref 70–99)
HCT VFR BLD CALC: 24 % — LOW (ref 34.5–45)
HCT VFR BLD CALC: 28.1 % — LOW (ref 34.5–45)
HGB BLD-MCNC: 7.7 G/DL — LOW (ref 11.5–15.5)
HGB BLD-MCNC: 9.3 G/DL — LOW (ref 11.5–15.5)
INR BLD: 1.19 — HIGH (ref 0.88–1.16)
LACTATE SERPL-SCNC: 1 MMOL/L — SIGNIFICANT CHANGE UP (ref 0.5–2)
MAGNESIUM SERPL-MCNC: 1.6 MG/DL — SIGNIFICANT CHANGE UP (ref 1.6–2.4)
MCHC RBC-ENTMCNC: 22.7 PG — LOW (ref 27–34)
MCHC RBC-ENTMCNC: 23.3 PG — LOW (ref 27–34)
MCHC RBC-ENTMCNC: 32.1 G/DL — SIGNIFICANT CHANGE UP (ref 32–36)
MCHC RBC-ENTMCNC: 33.1 G/DL — SIGNIFICANT CHANGE UP (ref 32–36)
MCV RBC AUTO: 70.3 FL — LOW (ref 80–100)
MCV RBC AUTO: 70.8 FL — LOW (ref 80–100)
PLATELET # BLD AUTO: 333 K/UL — SIGNIFICANT CHANGE UP (ref 150–400)
PLATELET # BLD AUTO: 346 K/UL — SIGNIFICANT CHANGE UP (ref 150–400)
POTASSIUM SERPL-MCNC: 3.8 MMOL/L — SIGNIFICANT CHANGE UP (ref 3.5–5.3)
POTASSIUM SERPL-MCNC: 3.8 MMOL/L — SIGNIFICANT CHANGE UP (ref 3.5–5.3)
POTASSIUM SERPL-SCNC: 3.8 MMOL/L — SIGNIFICANT CHANGE UP (ref 3.5–5.3)
POTASSIUM SERPL-SCNC: 3.8 MMOL/L — SIGNIFICANT CHANGE UP (ref 3.5–5.3)
PROT SERPL-MCNC: 5.6 G/DL — LOW (ref 6.4–8.2)
PROTHROM AB SERPL-ACNC: 13.2 SEC — HIGH (ref 10–13.1)
RBC # BLD: 3.39 M/UL — LOW (ref 3.8–5.2)
RBC # BLD: 4 M/UL — SIGNIFICANT CHANGE UP (ref 3.8–5.2)
RBC # FLD: 18 % — HIGH (ref 10.3–16.9)
RBC # FLD: 18.2 % — HIGH (ref 10.3–16.9)
SODIUM SERPL-SCNC: 133 MMOL/L — LOW (ref 135–145)
SODIUM SERPL-SCNC: 136 MMOL/L — SIGNIFICANT CHANGE UP (ref 135–145)
WBC # BLD: 12.6 K/UL — HIGH (ref 3.8–10.5)
WBC # BLD: 18.7 K/UL — HIGH (ref 3.8–10.5)
WBC # FLD AUTO: 12.6 K/UL — HIGH (ref 3.8–10.5)
WBC # FLD AUTO: 18.7 K/UL — HIGH (ref 3.8–10.5)

## 2017-02-02 PROCEDURE — 99233 SBSQ HOSP IP/OBS HIGH 50: CPT

## 2017-02-02 PROCEDURE — 71010: CPT | Mod: 26

## 2017-02-02 RX ORDER — IPRATROPIUM/ALBUTEROL SULFATE 18-103MCG
3 AEROSOL WITH ADAPTER (GRAM) INHALATION ONCE
Qty: 0 | Refills: 0 | Status: DISCONTINUED | OUTPATIENT
Start: 2017-02-02 | End: 2017-02-03

## 2017-02-02 RX ORDER — VANCOMYCIN HCL 1 G
VIAL (EA) INTRAVENOUS
Qty: 0 | Refills: 0 | Status: DISCONTINUED | OUTPATIENT
Start: 2017-02-02 | End: 2017-02-05

## 2017-02-02 RX ORDER — PIPERACILLIN AND TAZOBACTAM 4; .5 G/20ML; G/20ML
3.38 INJECTION, POWDER, LYOPHILIZED, FOR SOLUTION INTRAVENOUS ONCE
Qty: 0 | Refills: 0 | Status: COMPLETED | OUTPATIENT
Start: 2017-02-02 | End: 2017-02-02

## 2017-02-02 RX ORDER — VANCOMYCIN HCL 1 G
750 VIAL (EA) INTRAVENOUS EVERY 24 HOURS
Qty: 0 | Refills: 0 | Status: DISCONTINUED | OUTPATIENT
Start: 2017-02-03 | End: 2017-02-05

## 2017-02-02 RX ORDER — VANCOMYCIN HCL 1 G
750 VIAL (EA) INTRAVENOUS ONCE
Qty: 0 | Refills: 0 | Status: COMPLETED | OUTPATIENT
Start: 2017-02-02 | End: 2017-02-02

## 2017-02-02 RX ORDER — MAGNESIUM SULFATE 500 MG/ML
2 VIAL (ML) INJECTION ONCE
Qty: 0 | Refills: 0 | Status: COMPLETED | OUTPATIENT
Start: 2017-02-02 | End: 2017-02-02

## 2017-02-02 RX ORDER — IPRATROPIUM/ALBUTEROL SULFATE 18-103MCG
3 AEROSOL WITH ADAPTER (GRAM) INHALATION EVERY 6 HOURS
Qty: 0 | Refills: 0 | Status: DISCONTINUED | OUTPATIENT
Start: 2017-02-02 | End: 2017-02-06

## 2017-02-02 RX ORDER — PIPERACILLIN AND TAZOBACTAM 4; .5 G/20ML; G/20ML
3.38 INJECTION, POWDER, LYOPHILIZED, FOR SOLUTION INTRAVENOUS EVERY 6 HOURS
Qty: 0 | Refills: 0 | Status: DISCONTINUED | OUTPATIENT
Start: 2017-02-02 | End: 2017-02-04

## 2017-02-02 RX ADMIN — NYSTATIN CREAM 1 APPLICATION(S): 100000 CREAM TOPICAL at 06:29

## 2017-02-02 RX ADMIN — Medication 3 MILLILITER(S): at 23:09

## 2017-02-02 RX ADMIN — Medication 3 MILLILITER(S): at 16:25

## 2017-02-02 RX ADMIN — Medication 50 GRAM(S): at 19:59

## 2017-02-02 RX ADMIN — Medication 250 MILLIGRAM(S): at 19:26

## 2017-02-02 RX ADMIN — NYSTATIN CREAM 1 APPLICATION(S): 100000 CREAM TOPICAL at 17:53

## 2017-02-02 RX ADMIN — HEPARIN SODIUM 5000 UNIT(S): 5000 INJECTION INTRAVENOUS; SUBCUTANEOUS at 06:29

## 2017-02-02 RX ADMIN — PIPERACILLIN AND TAZOBACTAM 200 GRAM(S): 4; .5 INJECTION, POWDER, LYOPHILIZED, FOR SOLUTION INTRAVENOUS at 16:51

## 2017-02-02 RX ADMIN — SODIUM CHLORIDE 80 MILLILITER(S): 9 INJECTION, SOLUTION INTRAVENOUS at 21:39

## 2017-02-02 RX ADMIN — HEPARIN SODIUM 5000 UNIT(S): 5000 INJECTION INTRAVENOUS; SUBCUTANEOUS at 17:53

## 2017-02-02 RX ADMIN — PIPERACILLIN AND TAZOBACTAM 200 GRAM(S): 4; .5 INJECTION, POWDER, LYOPHILIZED, FOR SOLUTION INTRAVENOUS at 23:09

## 2017-02-02 NOTE — PROGRESS NOTE ADULT - PROBLEM SELECTOR PLAN 1
Likely due to poor PO intake from end stage dementia and sedating medications.   S/p 1L bolus in the ED, patient still dry on exam, on start fluid at 75cc/hour.  Holding Trazadone, gabapentin, and lorantine due to sedation.  Speech and swallow consult on board  Palliative care had a discussion with the patient, despite the lack of improvement in survival patient is to get a PEG tube per family by Dr. Lucero possibly today

## 2017-02-02 NOTE — PROGRESS NOTE ADULT - SUBJECTIVE AND OBJECTIVE BOX
Hospital Course:  89 yo F history of Multiple CVAs, PAFIB, admitted for poor PO intake. Patient had leukocytosis but no other signs of clear infection. Vascular commented NTD about the gangrenous toes. Palliative was consulted and patient was full code after discussion and  they wanted a PEG. Post PEG a rapid was called with patient in RVR and tachypneic with apneic episodes. Patient started on empiric antibiotics, nebulizers, and given 10 mg of diltiazem for control.  Stepped up to 7 [place] for further monitoring of neuro status.      Subjective: Patient seen and examined at bedside.  Pt not responding to questioning.      [OBJECTIVE]:    Vital Signs:  T(F): 98.5, Max: 98.5 (02-02 @ 15:15)  HR: 92 (92 - 141)  BP: 171/74 (160/98 - 182/74)  BP(mean): --  RR: 20 (18 - 30)  SpO2: 100% (95% - 100%)  Wt(kg): --  CVP(cm H2O): --    I & Os for 24h ending 02-02 @ 07:00  =============================================  IN: 720 ml / OUT: 500 ml / NET: 220 ml    I & Os for current day (as of 02-02 @ 20:18)  =============================================  IN: 490 ml / OUT: 450 ml / NET: 40 ml    CAPILLARY BLOOD GLUCOSE  149 (02 Feb 2017 15:48)    PHYSICAL EXAM:    Constitutional: NAD. Well Appearing.   Eyes: No scleral icterus. No Conj Pallor.   ENMT: MMM. Neck   Neck: No JVD  Respiratory: CTABL   Cardiovascular: Normal S1 and S2 no MRG   Gastrointestinal: BS normoactive. S/NT/ND.   Extremities: WWP. DP 2 plus.   Neurological: AAOX0. Reflexes 3 plus. Strength 5/5 upper and lower. No Dysmetria.    Medications:  MEDICATIONS  (STANDING):  atorvastatin 20milliGRAM(s) Oral at bedtime  folic acid 1milliGRAM(s) Oral daily  ascorbic acid 500milliGRAM(s) Oral daily  heparin  Injectable 5000Unit(s) SubCutaneous every 12 hours  dextrose 5% + sodium chloride 0.45%. 1000milliLiter(s) IV Continuous <Continuous>  nystatin Powder 1Application(s) Topical two times a day  diltiazem Injectable 5milliGRAM(s) IV Push once  ALBUTerol/ipratropium for Nebulization. 3milliLiter(s) Nebulizer once  piperacillin/tazobactam IVPB. 3.375Gram(s) IV Intermittent every 6 hours  vancomycin  IVPB  IV Intermittent   ALBUTerol/ipratropium for Nebulization 3milliLiter(s) Nebulizer every 6 hours    MEDICATIONS  (PRN):  bisacodyl 5milliGRAM(s) Oral every 12 hours PRN Constipation      Allergies:      No Known Allergies    Intolerances        Labs:                        9.3    18.7  )-----------( 346      ( 02 Feb 2017 16:03 )             28.1     02 Feb 2017 16:03    133    |  105    |  9      ----------------------------<  143    3.8     |  19     |  0.35     Ca    8.1        02 Feb 2017 16:03  Mg     1.6       02 Feb 2017 16:03    TPro  5.6    /  Alb  1.8    /  TBili  0.4    /  DBili  x      /  AST  20     /  ALT  17     /  AlkPhos  68     02 Feb 2017 06:18    PT/INR - ( 02 Feb 2017 06:18 )   PT: 13.2 sec;   INR: 1.19          PTT - ( 02 Feb 2017 06:18 )  PTT:29.0 sec      Radiology and other tests:

## 2017-02-02 NOTE — CONSULT NOTE ADULT - PROBLEM SELECTOR RECOMMENDATION 9
No indication for antibiotics
Severe dementia likely secondary to multiple prior CVAs, has been bedbound and nonverbal for many years. FAST 7E
- Patient s/p PEG tube placement, and after procedure had tachypnea and tachycardia. DDx - Rapid atrial fibrillation causing flash pulmonary edema v. aspiration pneumonia. CXR showed an air bronchogram juxtaposed to the right atrium, suggesting the patient had an aspiration pneumonia now in RML  - Because patient has been here for >48 hours. Possibly had hospital acquired pneumonia as well. Because of this will treat with Vancomycin and Zosyn, renally dosed for 7 days  - Holding IVF given increased pulmonary vascular congestion on CXR  - Monitor respiratory status, keep on HFNC for now.

## 2017-02-02 NOTE — CHART NOTE - NSCHARTNOTEFT_GEN_A_CORE
TRANSFER OF CARE NOTE :       Brief Hospital Course: 89 yo F history of Multiple CVAs, PAFIB, admitted for poor PO intake. Patient had leukocytosis but no other signs of clear infection. Vascular commented NTD about the gangrenous toes. Palliative was consulted and patient was full code after discussion and  they wanted a PEG. Post PEG a rapid was called with patient in RVR and tachypneic with apneic episodes. Patient started on empiric antibiotics, nebulizers, and given 10 mg of diltiazem for control.

## 2017-02-02 NOTE — PROGRESS NOTE ADULT - SUBJECTIVE AND OBJECTIVE BOX
Pt seen and examined PEG today  consent from HCP/son    REVIEW OF SYSTEMS:  Constitutional: No fever  patient unable to give        MEDICATIONS:  MEDICATIONS  (STANDING):  atorvastatin 20milliGRAM(s) Oral at bedtime  folic acid 1milliGRAM(s) Oral daily  ascorbic acid 500milliGRAM(s) Oral daily  heparin  Injectable 5000Unit(s) SubCutaneous every 12 hours  dextrose 5% + sodium chloride 0.45%. 1000milliLiter(s) IV Continuous <Continuous>  nystatin Powder 1Application(s) Topical two times a day    MEDICATIONS  (PRN):  bisacodyl 5milliGRAM(s) Oral every 12 hours PRN Constipation      Allergies    No Known Allergies    Intolerances        Vital Signs Last 24 Hrs  T(C): 36.8, Max: 37.2 (02-01 @ 16:36)  T(F): 98.3, Max: 98.9 (02-01 @ 16:36)  HR: 97 (94 - 100)  BP: 182/74 (166/77 - 182/74)  BP(mean): --  RR: 20 (18 - 20)  SpO2: 96% (96% - 98%)    I & Os for current day (as of 02-02 @ 12:58)  =============================================  IN: 720 ml / OUT: 500 ml / NET: 220 ml      PHYSICAL EXAM:    General: Well developed; well nourished; in no acute distress  HEENT: MMM, conjunctiva and sclera clear  Luns: clear but poor inspiration  Heart: regular  Gastrointestinal: Soft non-tender non-distended; Normal bowel sounds; No hepatosplenomegaly  Skin: gangrene of foot    LABS:      CBC Full  -  ( 02 Feb 2017 06:18 )  WBC Count : 12.6 K/uL  Hemoglobin : 7.7 g/dL  Hematocrit : 24.0 %  Platelet Count - Automated : 333 K/uL  Mean Cell Volume : 70.8 fL  Mean Cell Hemoglobin : 22.7 pg  Mean Cell Hemoglobin Concentration : 32.1 g/dL  Auto Neutrophil # : x  Auto Lymphocyte # : x  Auto Monocyte # : x  Auto Eosinophil # : x  Auto Basophil # : x  Auto Neutrophil % : x  Auto Lymphocyte % : x  Auto Monocyte % : x  Auto Eosinophil % : x  Auto Basophil % : x    02 Feb 2017 06:18    136    |  106    |  10     ----------------------------<  118    3.8     |  19     |  0.29     Ca    7.8        02 Feb 2017 06:18  Mg     1.7       01 Feb 2017 07:35    TPro  5.6    /  Alb  1.8    /  TBili  0.4    /  DBili  x      /  AST  20     /  ALT  17     /  AlkPhos  68     02 Feb 2017 06:18    PT/INR - ( 02 Feb 2017 06:18 )   PT: 13.2 sec;   INR: 1.19          PTT - ( 02 Feb 2017 06:18 )  PTT:29.0 sec                  RADIOLOGY & ADDITIONAL STUDIES (The following images were personally reviewed):

## 2017-02-02 NOTE — PROGRESS NOTE ADULT - SUBJECTIVE AND OBJECTIVE BOX
INTERVAL HPI/OVERNIGHT EVENTS: TITI.     ICU Vital Signs Last 24 Hrs  T(C): 36.6, Max: 37.4 (02-01 @ 11:18)  T(F): 97.9, Max: 99.3 (02-01 @ 11:18)  HR: 99 (94 - 100)  BP: 176/79 (166/77 - 181/71)  BP(mean): --  ABP: --  ABP(mean): --  RR: 19 (18 - 22)  SpO2: 96% (96% - 99%)    I&O's Summary    I & Os for current day (as of 02 Feb 2017 08:22)  =============================================  IN: 720 ml / OUT: 500 ml / NET: 220 ml    PHYSICAL EXAM:    Constitutional: NAD. Well Appearing.   Eyes: No scleral icterus. No Conj Pallor.   ENMT: MMM. Neck   Neck: No JVD  Respiratory: CTABL   Cardiovascular: Normal S1 and S2 no MRG   Gastrointestinal: BS normoactive. S/NT/ND.   Extremities: WWP. DP 2 plus.   Neurological: AAOX3. CN II- XII intact. Reflexes 3 plus. Strength 5/5 upper and lower. No Dysmetria.      MEDICATIONS  (STANDING):  atorvastatin 20milliGRAM(s) Oral at bedtime  folic acid 1milliGRAM(s) Oral daily  ascorbic acid 500milliGRAM(s) Oral daily  heparin  Injectable 5000Unit(s) SubCutaneous every 12 hours  dextrose 5% + sodium chloride 0.45%. 1000milliLiter(s) IV Continuous <Continuous>  nystatin Powder 1Application(s) Topical two times a day    MEDICATIONS  (PRN):  bisacodyl 5milliGRAM(s) Oral every 12 hours PRN Constipation      LABS:                        7.7    12.6  )-----------( 333      ( 02 Feb 2017 06:18 )             24.0     02 Feb 2017 06:18    136    |  106    |  10     ----------------------------<  118    3.8     |  19     |  0.29     Ca    7.8        02 Feb 2017 06:18  Mg     1.7       01 Feb 2017 07:35    TPro  5.6    /  Alb  1.8    /  TBili  0.4    /  DBili  x      /  AST  20     /  ALT  17     /  AlkPhos  68     02 Feb 2017 06:18    PT/INR - ( 02 Feb 2017 06:18 )   PT: 13.2 sec;   INR: 1.19          PTT - ( 02 Feb 2017 06:18 )  PTT:29.0 sec    I&O's Summary    I & Os for current day (as of 02 Feb 2017 08:22)  =============================================  IN: 720 ml / OUT: 500 ml / NET: 220 ml    RADIOLOGY & ADDITIONAL TESTS:

## 2017-02-02 NOTE — CONSULT NOTE ADULT - PROBLEM SELECTOR RECOMMENDATION 2
Pt with progressively worsening PO intake over course of last few weeks to months without easily identifiable cause, and likely represents a natural progression of her underlying disease process and advanced age.
- Not new atrial fibrillation, not on anticoagulation at home  - Rapid Atrial Fibrillation likely related to respiratory distress from pneumonia  - Keep on HFNC for now, continue with Abx and monitor HR on telemetry, re-initiate home Cardizem

## 2017-02-02 NOTE — CHART NOTE - NSCHARTNOTEFT_GEN_A_CORE
PGY1 EVENT NOTE     T(C): 36.9, Max: 36.9 (02-02 @ 15:15)  HR: 141 (97 - 141)  BP: 175/101 (175/101 - 182/74)  RR: 30 (18 - 30)  SpO2: 95% (95% - 97%)  Wt(kg): --    EVENT: Called in by RN for Tachycardia and ronchorous breathing following PEG placement. Patient did not receive sedation in the PEG placement and post procedure patient was tachycardic and tachypneic -. Upon arrival to the floor, EKG revealed AFIB with RVR in the 140-180s. 10 mg of Diltiazem was pushed IV which rate controlled the patient. The patient was also noted to have tachypnea with ronchorous sounds which were never present prior to going to the procedure. She was desaturating and having intermittent pauses in her breathing and Hi-pritesh was started with her saturations improving to 100. A rapid response was called. During the rapid CXR revealed probable aspiration pneumonia and patient was started on  empiric vancomycin and zosyn. Consultant Dr. Bragg was present as was ICU consult and given her tenuous respiratory status she was deemed necessary to be upgraded in her level of care.   Labs revealed increased leukocytosis to her prior level and lher lactate was 1.0   A full discussion was had with the family and they concurred that she is to remain full code.

## 2017-02-02 NOTE — PROGRESS NOTE ADULT - PROBLEM SELECTOR PLAN 2
WBC elevation UNCLEAR ETIOLOGY,  no sign of aspiration PNA on CXR, UA clear and decubitus ulcer appear non-infectious. Vital signs stable.  Will hold abx for now.  Wound consult for decubitus ulcers.  Dr. Rodríguez following reccs appreciated

## 2017-02-02 NOTE — CONSULT NOTE ADULT - SUBJECTIVE AND OBJECTIVE BOX
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 Consult is for respiratory distress and rapid atrial fibrillation    Patient seen at bedside. She is non verbal at baseline. As per primary team she went for a PEG tube today where it was placed without sedation/anesthesia. 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 Consult is for respiratory distress and rapid atrial fibrillation    Patient seen at bedside. She is non verbal at baseline. As per primary team she went for a PEG tube today where it was placed without sedation/anesthesia. After the procedure patient was tachypneic, tachycardic and observed to be in rapid atrial fibrillation on EKG.     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 Consult is for respiratory distress and rapid atrial fibrillation    Patient seen at bedside. She is non verbal at baseline. As per primary team she went for a PEG tube today where it was placed without sedation/anesthesia. After the procedure patient was tachypneic, tachycardic and observed to be in rapid atrial fibrillation on EKG.     Objective:  ICU Vital Signs Last 24 Hrs  T(C): 36.9, Max: 37.2 (02-01 @ 16:36)  T(F): 98.5, Max: 98.9 (02-01 @ 16:36)  HR: 141 (94 - 141)  BP: 175/101 (166/77 - 182/74)  BP(mean): --  ABP: --  ABP(mean): --  RR: 30 (18 - 30)  SpO2: 95% (95% - 98%)    General: respiratory distress, using accessory muscles, non verbal, not interactive   Head/Neck: NCAT EOMI PERRLA (+) JVD, no thyromegaly  CV: S1S2 irregularly irregular, no MRG  Lungs: diffuse rhonchi bilaterally   Abdomen: soft NTND +BS  Ext: multiple ecchymoses and healing ulcers, gangrenous left foot  Neuro: AAOx0, contracted body habitus in all 4 extremeties                           9.3    18.7  )-----------( 346      ( 02 Feb 2017 16:03 )             28.1     02 Feb 2017 06:18    136    |  106    |  10     ----------------------------<  118    3.8     |  19     |  0.29     Ca    7.8        02 Feb 2017 06:18  Mg     1.7       01 Feb 2017 07:35    TPro  5.6    /  Alb  1.8    /  TBili  0.4    /  DBili  x      /  AST  20     /  ALT  17     /  AlkPhos  68     02 Feb 2017 06:18    Lactic Acid 1.0    CXR: RML air bronchogram consistent with infectious process, no pleural effusion  EKG: rapid atrial fibrillation

## 2017-02-02 NOTE — CHART NOTE - NSCHARTNOTEFT_GEN_A_CORE
Palliative Medicine Nurse Practitioner  Called by medical resident to inform that patient had returned from her PEG tube placement and was found to be tachycardic and in AFib with RVR to 140-170 Treated with Diltiazem and rate controlled. Rapid Response was called and on CXR pt noted with aspiration PNA. Met with son Augustine at bedside. Unlike previous days where he stated that his brother Raji was to make all decisions due to the "religiosity" today he stated that both he and his brother are equal partners in decision making, but that [facility]. [facility] was [facility] [place]. Dr Pimentel received a T/C fro Luis Rajput requesting that she speak with the rabbi to discuss benefit vs burden of aggressive treatment to include, but not limited to CPR and resuscitation. Before Dr Pimentel could speak with the rabbi, Dr Lucero had discussed clinical situation with the son Raji and the rabbi and the decision was made to upgrade to %L for treatment of PNA Pt remains full code at this time.

## 2017-02-02 NOTE — PROGRESS NOTE ADULT - SUBJECTIVE AND OBJECTIVE BOX
patine uncommuicative    ANTIBIOTICS    MEDICATIONS  (STANDING):  atorvastatin 20milliGRAM(s) Oral at bedtime  folic acid 1milliGRAM(s) Oral daily  ascorbic acid 500milliGRAM(s) Oral daily  heparin  Injectable 5000Unit(s) SubCutaneous every 12 hours  dextrose 5% + sodium chloride 0.45%. 1000milliLiter(s) IV Continuous <Continuous>  nystatin Powder 1Application(s) Topical two times a day    MEDICATIONS  (PRN):  bisacodyl 5milliGRAM(s) Oral every 12 hours PRN Constipation      Allergies    No Known Allergies    Intolerances        REVIEW OF SYSTEMS:    patient unable to provide review of systems  Vital Signs Last 24 Hrs  T(C): 36.8, Max: 37.4 (02-01 @ 11:18)  T(F): 98.3, Max: 99.3 (02-01 @ 11:18)  HR: 97 (94 - 100)  BP: 182/74 (166/77 - 182/74)  BP(mean): --  RR: 20 (18 - 22)  SpO2: 96% (96% - 99%)    PHYSICAL EXAM:      Eyes: PERRL, EOM intact; conjunctiva and sclera clear  Head: Normocephalic; atraumatic  ENMT: No nasal discharge; airway clear  Neck: Supple; non tender; no masses  Respiratory: No wheezes, rales or rhonchi  Cardiovascular: Regular rate and rhythm. S1 and S2 Normal; No murmurs, gallops or rubs  Gastrointestinal: Soft non-tender non-distended; Normal bowel sounds; No hepatosplenomegaly  Genitourinary: No costovertebral angle tenderness  LABS:                        7.7    12.6  )-----------( 333      ( 02 Feb 2017 06:18 )             24.0     02 Feb 2017 06:18    136    |  106    |  10     ----------------------------<  118    3.8     |  19     |  0.29     Ca    7.8        02 Feb 2017 06:18  Mg     1.7       01 Feb 2017 07:35    TPro  5.6    /  Alb  1.8    /  TBili  0.4    /  DBili  x      /  AST  20     /  ALT  17     /  AlkPhos  68     02 Feb 2017 06:18    PT/INR - ( 02 Feb 2017 06:18 )   PT: 13.2 sec;   INR: 1.19          PTT - ( 02 Feb 2017 06:18 )  PTT:29.0 sec        MICROBIOLOGY:  Culture Results:   No growth (01-30 @ 06:43)  Culture Results:   No growth at 3 days. (01-29 @ 18:45)  Culture Results:   No growth at 3 days. (01-29 @ 18:45)      RADIOLOGY & ADDITIONAL STUDIES:

## 2017-02-02 NOTE — CONSULT NOTE ADULT - ASSESSMENT
leukocytosis most likely due to gangrene of foot
Patient is a 89 yo Female pmh of multiple CVAs many years ago, contracted and non-verbal at baseline x 3 years, HTN, DM2, who was brought in by family for worsening PO intake and decline in mental status. Per family, pt had PEG tube placed years ago after stroke left her with significant dysphagia and hemiparesis. Pt's dysphagia improved and pt was able to eat independently. Her PEG tube became dislodged accidentally, so it was left out as pt was eating well. Over last few weeks, pt has had decline in PO intake and overall mental status. In addition, pt has had chronic non-healing wounds which have worsened over last few weeks despite aggressive wound care by family, [facility] wound care and 24 hour aides at home. Seen by their PMD in the home and told to come to the hospital for repeat PEG tube to improve nutritional status in hopes that this might allow for improved wound healing. Was seen by GI with PEG tube being offered.
89F admitted for PEG placement, consult for respiratory distress

## 2017-02-03 DIAGNOSIS — J69.0 PNEUMONITIS DUE TO INHALATION OF FOOD AND VOMIT: ICD-10-CM

## 2017-02-03 DIAGNOSIS — I48.91 UNSPECIFIED ATRIAL FIBRILLATION: ICD-10-CM

## 2017-02-03 DIAGNOSIS — Z43.1 ENCOUNTER FOR ATTENTION TO GASTROSTOMY: ICD-10-CM

## 2017-02-03 DIAGNOSIS — Z93.1 GASTROSTOMY STATUS: ICD-10-CM

## 2017-02-03 LAB
ANION GAP SERPL CALC-SCNC: 11 MMOL/L — SIGNIFICANT CHANGE UP (ref 9–16)
BUN SERPL-MCNC: 8 MG/DL — SIGNIFICANT CHANGE UP (ref 7–23)
CALCIUM SERPL-MCNC: 7.4 MG/DL — LOW (ref 8.5–10.5)
CHLORIDE SERPL-SCNC: 104 MMOL/L — SIGNIFICANT CHANGE UP (ref 96–108)
CO2 SERPL-SCNC: 19 MMOL/L — LOW (ref 22–31)
CREAT SERPL-MCNC: 0.34 MG/DL — LOW (ref 0.5–1.3)
CULTURE RESULTS: SIGNIFICANT CHANGE UP
CULTURE RESULTS: SIGNIFICANT CHANGE UP
GLUCOSE SERPL-MCNC: 157 MG/DL — HIGH (ref 70–99)
HCT VFR BLD CALC: 23.7 % — LOW (ref 34.5–45)
HGB BLD-MCNC: 7.6 G/DL — LOW (ref 11.5–15.5)
MAGNESIUM SERPL-MCNC: 2.1 MG/DL — SIGNIFICANT CHANGE UP (ref 1.6–2.4)
MCHC RBC-ENTMCNC: 22.7 PG — LOW (ref 27–34)
MCHC RBC-ENTMCNC: 32.1 G/DL — SIGNIFICANT CHANGE UP (ref 32–36)
MCV RBC AUTO: 70.7 FL — LOW (ref 80–100)
PLATELET # BLD AUTO: 299 K/UL — SIGNIFICANT CHANGE UP (ref 150–400)
POTASSIUM SERPL-MCNC: 3.1 MMOL/L — LOW (ref 3.5–5.3)
POTASSIUM SERPL-SCNC: 3.1 MMOL/L — LOW (ref 3.5–5.3)
RBC # BLD: 3.35 M/UL — LOW (ref 3.8–5.2)
RBC # FLD: 17.8 % — HIGH (ref 10.3–16.9)
SODIUM SERPL-SCNC: 134 MMOL/L — LOW (ref 135–145)
SPECIMEN SOURCE: SIGNIFICANT CHANGE UP
SPECIMEN SOURCE: SIGNIFICANT CHANGE UP
WBC # BLD: 13.3 K/UL — HIGH (ref 3.8–10.5)
WBC # FLD AUTO: 13.3 K/UL — HIGH (ref 3.8–10.5)

## 2017-02-03 PROCEDURE — 99233 SBSQ HOSP IP/OBS HIGH 50: CPT | Mod: GC

## 2017-02-03 PROCEDURE — 99233 SBSQ HOSP IP/OBS HIGH 50: CPT

## 2017-02-03 RX ORDER — POTASSIUM CHLORIDE 20 MEQ
10 PACKET (EA) ORAL
Qty: 0 | Refills: 0 | Status: COMPLETED | OUTPATIENT
Start: 2017-02-03 | End: 2017-02-03

## 2017-02-03 RX ORDER — POTASSIUM CHLORIDE 20 MEQ
40 PACKET (EA) ORAL ONCE
Qty: 0 | Refills: 0 | Status: COMPLETED | OUTPATIENT
Start: 2017-02-03 | End: 2017-02-03

## 2017-02-03 RX ADMIN — PIPERACILLIN AND TAZOBACTAM 200 GRAM(S): 4; .5 INJECTION, POWDER, LYOPHILIZED, FOR SOLUTION INTRAVENOUS at 05:22

## 2017-02-03 RX ADMIN — PIPERACILLIN AND TAZOBACTAM 200 GRAM(S): 4; .5 INJECTION, POWDER, LYOPHILIZED, FOR SOLUTION INTRAVENOUS at 12:37

## 2017-02-03 RX ADMIN — Medication 100 MILLIEQUIVALENT(S): at 10:45

## 2017-02-03 RX ADMIN — Medication 500 MILLIGRAM(S): at 12:37

## 2017-02-03 RX ADMIN — Medication 40 MILLIEQUIVALENT(S): at 08:59

## 2017-02-03 RX ADMIN — Medication 100 MILLIEQUIVALENT(S): at 12:29

## 2017-02-03 RX ADMIN — Medication 1 MILLIGRAM(S): at 12:37

## 2017-02-03 RX ADMIN — NYSTATIN CREAM 1 APPLICATION(S): 100000 CREAM TOPICAL at 18:48

## 2017-02-03 RX ADMIN — Medication 3 MILLILITER(S): at 12:37

## 2017-02-03 RX ADMIN — Medication 100 MILLIEQUIVALENT(S): at 08:59

## 2017-02-03 RX ADMIN — HEPARIN SODIUM 5000 UNIT(S): 5000 INJECTION INTRAVENOUS; SUBCUTANEOUS at 05:22

## 2017-02-03 RX ADMIN — Medication 250 MILLIGRAM(S): at 17:37

## 2017-02-03 RX ADMIN — PIPERACILLIN AND TAZOBACTAM 200 GRAM(S): 4; .5 INJECTION, POWDER, LYOPHILIZED, FOR SOLUTION INTRAVENOUS at 18:48

## 2017-02-03 RX ADMIN — HEPARIN SODIUM 5000 UNIT(S): 5000 INJECTION INTRAVENOUS; SUBCUTANEOUS at 18:48

## 2017-02-03 RX ADMIN — SODIUM CHLORIDE 80 MILLILITER(S): 9 INJECTION, SOLUTION INTRAVENOUS at 18:48

## 2017-02-03 RX ADMIN — Medication 3 MILLILITER(S): at 18:48

## 2017-02-03 RX ADMIN — Medication 3 MILLILITER(S): at 05:22

## 2017-02-03 RX ADMIN — NYSTATIN CREAM 1 APPLICATION(S): 100000 CREAM TOPICAL at 05:22

## 2017-02-03 NOTE — PROGRESS NOTE ADULT - PROBLEM SELECTOR PLAN 4
Pt with multiple non-healing decubitus ulcers in setting of poor nutritional status. Is receiving aggressive wound care in the home provided primarily by family members, but may benefit from more services in the home. Discussed [facility] home hospice with family and will make referral for hospice services tomorrow.

## 2017-02-03 NOTE — PROGRESS NOTE ADULT - SUBJECTIVE AND OBJECTIVE BOX
CHUCK LUCIO   MRN-[id number]    CC:     HPI:  Patient is a 89 yo Female pmh of multiple CVAs many years ago, 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 Per family, poor PO intake has been going on for many months, gradually decreasing. Per home health aid, patient had very little PO intake for past 2 days. Denied fever/chill, no nausea/vomiting, no sign of distress. About 5 years ago patient had a PEG tube, which improved her nutritional status tremendously so PEG tube was subsequently removed when patient started to tolerate PO again .PEG tube place on 2/2. shortly upon return to floors rapid response was called as pt was noted to be tachycardic and tachypneic. Pt found to be in A Fib with -170's with audible rhonchi and wheezing likely 2/2 aspiration. Pt upgraded to ICU stepdown for closer monitoring    Subjective:    DYSPNEA: N	  NAUS/VOM:N	  SECRETIONS: N	  AGITATION: N  Pain (Y/N):  appears comfortable, no grimacing or moaning     -Provocation/Palliation:  -Quality/Quantity:  -Radiating:  -Severity:  -Timing/Frequency:  -Impact on ADLs:    OTHER REVIEW OF SYSTEMS:  UNABLE TO OBTAIN  due to: AMS    PEx:  T(C): 36.8, Max: 37.7 (02-02 @ 22:00)  HR: 102 (87 - 123)  BP: 169/85 (155/79 - 180/90)  RR: 20 (18 - 24)  SpO2: 99% (99% - 100%)  Wt(kg): --36.6    Constitutional: elderly female in bed in NAD  HEENT: No scleral icterus. No Conjunctival Pallor, MM dry.   Neck : supple no JVD  Respiratory: audible wheezing   Cardiovascular: Normal S1 and S2, tachycardic no MRG   Gastrointestinal: BS normoactive. S/NT/ND. + PGT in situ, dsg dry and intact  : + hussein  Extremities: WWP. Cyanotic toes.B/L upper and lower extremity contractures   Neurological:lethargic, NV cannot follow commands  Psych: calm   Skin: multiple decubiti ulcers      No Known Allergies      OPIATE NAÏVE : N  iSTOP REVIEWED N    MEDICATIONS: REVIEWED  MEDICATIONS  (STANDING):  atorvastatin 20milliGRAM(s) Oral at bedtime  folic acid 1milliGRAM(s) Oral daily  ascorbic acid 500milliGRAM(s) Oral daily  heparin  Injectable 5000Unit(s) SubCutaneous every 12 hours  dextrose 5% + sodium chloride 0.45%. 1000milliLiter(s) IV Continuous <Continuous>  nystatin Powder 1Application(s) Topical two times a day  piperacillin/tazobactam IVPB. 3.375Gram(s) IV Intermittent every 6 hours  vancomycin  IVPB  IV Intermittent   ALBUTerol/ipratropium for Nebulization 3milliLiter(s) Nebulizer every 6 hours  vancomycin  IVPB 750milliGRAM(s) IV Intermittent every 24 hours  diltiazem    Tablet 180milliGRAM(s) Oral two times a day    MEDICATIONS  (PRN):  bisacodyl 5milliGRAM(s) Oral every 12 hours PRN Constipation    LABS: REVIEWED    IMAGING: REVIEWED    ADVANCED DIRECTIVES:     FULL CODE         DECISION MAKER: Raji Lucio  LEGAL SURROGATE:    PSYCHOSOCIAL-SPIRITUAL ASSESSMENT:       Reviewed       Care plan unchanged      GOALS OF CARE DISCUSSION       Palliative care info/counseling provided	          See previous Palliative Medicine Note       Documentation of GOC:   	      AGENCY CHOICE DISCUSSED:     none    REFERRALS	        Palliative Med        Unit SW/Case Mgmt              Speech/Swallow              CRITICAL CARE TIME PROVIDED TO UNSTABLE PT W/ ORGAN FAILURE	   Start:               End:  	       Minutes:              > 50% OF THE TIME SPENT IN COUNSELING AND COORDINATING CARE 	   Start:               End:  	       Minutes:      PROLONGED SERVICE             FACE TO FACE:    PT            PT & FAMILY	   Start:11:10               End:  11:40	       Minutes:30      Advance Care Planning Time:

## 2017-02-03 NOTE — PROGRESS NOTE ADULT - PROBLEM SELECTOR PLAN 2
Likely due to poor PO intake from end stage dementia and sedating medications.   Dry on exam  Holding Trazadone, gabapentin, and lorantine due to sedation.  Speech and swallow consult on board  Pt is s/p PEG on 2/2/17 - tube feeds started today Post procedure patient was apneic with a cough with air bronchograms seen on CXR.     - Now Day 2/7 of vancomycin zosyn

## 2017-02-03 NOTE — PROGRESS NOTE ADULT - PROBLEM SELECTOR PLAN 1
Likely due to poor PO intake from end stage dementia and sedating medications.   S/p 1L bolus in the ED, patient still dry on exam, on start fluid at 75cc/hour.  Holding Trazadone, gabapentin, and lorantine due to sedation.  Speech and swallow consult on board  Palliative care had a discussion with the patient, despite the lack of improvement in survival patient is to get a PEG tube per family by Dr. Lucero possibly today Likely due to poor PO intake from end stage dementia and sedating medications.   Dry on exam  Holding Trazadone, gabapentin, and lorantine due to sedation.  Speech and swallow consult on board  Pt is s/p PEG on 2/2, now w/ likely aspiration PNA Likely due to poor PO intake from end stage dementia and sedating medications.   Dry on exam  Holding Trazadone, gabapentin, and lorantine due to sedation.  Speech and swallow consult on board  Pt is s/p PEG on 2/2/17 - tube feeds started today

## 2017-02-03 NOTE — PROGRESS NOTE ADULT - PROBLEM SELECTOR PLAN 10
NPO pending speech and swallow consult.  FULL CODE NPO pending speech and swallow consult  Feeds w/ Glucerna 2.5 through PEG  FULL CODE Feeds w/ Glucerna 2.5 through PEG  FULL CODE

## 2017-02-03 NOTE — PROGRESS NOTE ADULT - SUBJECTIVE AND OBJECTIVE BOX
Pt seen and examined Events noted.  I was back yesterday afternoon to see her.  S/p aspiration.  (probably post procedure as she was in GI recovery room without any incidets)    REVIEW OF SYSTEMS:  unable to give      MEDICATIONS:  MEDICATIONS  (STANDING):  atorvastatin 20milliGRAM(s) Oral at bedtime  folic acid 1milliGRAM(s) Oral daily  ascorbic acid 500milliGRAM(s) Oral daily  heparin  Injectable 5000Unit(s) SubCutaneous every 12 hours  dextrose 5% + sodium chloride 0.45%. 1000milliLiter(s) IV Continuous <Continuous>  nystatin Powder 1Application(s) Topical two times a day  diltiazem Injectable 5milliGRAM(s) IV Push once  piperacillin/tazobactam IVPB. 3.375Gram(s) IV Intermittent every 6 hours  vancomycin  IVPB  IV Intermittent   ALBUTerol/ipratropium for Nebulization 3milliLiter(s) Nebulizer every 6 hours  vancomycin  IVPB 750milliGRAM(s) IV Intermittent every 24 hours  diltiazem    Tablet 180milliGRAM(s) Oral two times a day  potassium chloride  10 mEq/100 mL IVPB 10milliEquivalent(s) IV Intermittent every 1 hour    MEDICATIONS  (PRN):  bisacodyl 5milliGRAM(s) Oral every 12 hours PRN Constipation      Allergies    No Known Allergies    Intolerances        Vital Signs Last 24 Hrs  T(C): 37.3, Max: 37.7 (02-02 @ 22:00)  T(F): 99.2, Max: 99.8 (02-02 @ 22:00)  HR: 92 (87 - 141)  BP: 155/79 (155/79 - 179/79)  BP(mean): 114 (113 - 117)  RR: 23 (18 - 30)  SpO2: 100% (95% - 100%)    I & Os for current day (as of 02-03 @ 09:36)  =============================================  IN: 1545 ml / OUT: 1050 ml / NET: 495 ml      PHYSICAL EXAM:    General:on high flow )2  seems comfortable  HEENT: MMM, conjunctiva and sclera clear  Gastrointestinal: Soft non-tender non-distended; Peg site clean and dry Normal bowel sounds; No hepatosplenomegaly  Skin: Warm and dry. No obvious rash    LABS:      CBC Full  -  ( 03 Feb 2017 06:34 )  WBC Count : 13.3 K/uL  Hemoglobin : 7.6 g/dL  Hematocrit : 23.7 %  Platelet Count - Automated : 299 K/uL  Mean Cell Volume : 70.7 fL  Mean Cell Hemoglobin : 22.7 pg  Mean Cell Hemoglobin Concentration : 32.1 g/dL  Auto Neutrophil # : x  Auto Lymphocyte # : x  Auto Monocyte # : x  Auto Eosinophil # : x  Auto Basophil # : x  Auto Neutrophil % : x  Auto Lymphocyte % : x  Auto Monocyte % : x  Auto Eosinophil % : x  Auto Basophil % : x    03 Feb 2017 06:34    134    |  104    |  8      ----------------------------<  157    3.1     |  19     |  0.34     Ca    7.4        03 Feb 2017 06:34  Mg     2.1       03 Feb 2017 06:34    TPro  5.6    /  Alb  1.8    /  TBili  0.4    /  DBili  x      /  AST  20     /  ALT  17     /  AlkPhos  68     02 Feb 2017 06:18    PT/INR - ( 02 Feb 2017 06:18 )   PT: 13.2 sec;   INR: 1.19          PTT - ( 02 Feb 2017 06:18 )  PTT:29.0 sec                  RADIOLOGY & ADDITIONAL STUDIES (The following images were personally reviewed):

## 2017-02-03 NOTE — PROGRESS NOTE ADULT - PROBLEM SELECTOR PLAN 6
Chronically contracted and non-verbal.   Continue with statin. Likely end stage, family wants full treatment at this point.  Consider palliative consult

## 2017-02-03 NOTE — PROGRESS NOTE ADULT - PROBLEM SELECTOR PLAN 3
WBC elevation UNCLEAR ETIOLOGY, present since admission.  No sign of aspiration PNA on CXR, UA clear and decubitus ulcer appear non-infectious.  Post PEG a rapid was called with patient in RVR and tachypneic with apneic episodes.   - Was started on Vanc/Zosyn 2/2     - Vanc trough on 2/5 at 17:00  - Wound consult for decubitus ulcers.  Dr. Rodríguez following recs appreciated

## 2017-02-03 NOTE — PROGRESS NOTE ADULT - PROBLEM SELECTOR PLAN 7
Met with pt son Felice (Augustine) at pt bedside. Ongoing  emotional support Further discussion Re: benefit of cardiac compressions and ventilatory support. as patient nears her death.He is not decision maker but wishes to relay info to his family

## 2017-02-03 NOTE — PROGRESS NOTE ADULT - SUBJECTIVE AND OBJECTIVE BOX
PGY-1 TRANSFER OF CARE ACCEPTANCE NOTE      89 yo F history of Multiple CVAs, PAFIB, admitted for poor PO intake. Patient had leukocytosis but no other signs of clear infection. Vascular commented NTD about the gangrenous toes. Palliative was consulted and patient was full code after discussion and  they wanted a PEG. Post PEG a rapid was called with patient in RVR and tachypneic with apneic episodes. Patient started on empiric antibiotics for suspected aspiration PNA, nebulizers, and given 10 mg of diltiazem for control.  Stepped up to [facility] for further monitoring of neuro status.  She was initially requiring HFNC, but was later satting >95% on 3L. Started on feeds through PEG tube.  She clinically improved and was deemed stable for SD to RMF.     Patient remains FULL CODE. Decreased high flow to ~50% and she is still spo2>94%.    INTERVAL HPI/OVERNIGHT EVENTS:    ICU Vital Signs Last 24 Hrs  T(C): 36.8, Max: 37.7 (02-02 @ 22:00)  T(F): 98.2, Max: 99.8 (02-02 @ 22:00)  HR: 98 (87 - 106)  BP: 158/74 (155/79 - 180/90)  BP(mean): 122 (113 - 122)  ABP: --  ABP(mean): --  RR: 30 (20 - 30)  SpO2: 100% (99% - 100%)    I&O's Summary  I & Os for 24h ending 03 Feb 2017 07:00  =============================================  IN: 1545 ml / OUT: 1050 ml / NET: 495 ml    I & Os for current day (as of 03 Feb 2017 19:19)  =============================================  IN: 365 ml / OUT: 500 ml / NET: -135 ml    PHYSICAL EXAM:    Constitutional: NAD. Well Appearing.   Eyes: No scleral icterus. No Conj Pallor.   ENMT: MMM. Neck   Neck: No JVD  Respiratory: CTABL   Cardiovascular: Irregular rhythm.   Gastrointestinal: BS normoactive. S/NT/ND.   Extremities: WWP. Stage 4 sacrald ecubitus ulcer, blue dry gangrnenous toes, several pressure ulcer.   Neurological: AAOX3. CN II- XII intact. Reflexes 3 plus. Strength 5/5 upper and lower. No Dysmetria.      MEDICATIONS  (STANDING):  atorvastatin 20milliGRAM(s) Oral at bedtime  folic acid 1milliGRAM(s) Oral daily  ascorbic acid 500milliGRAM(s) Oral daily  heparin  Injectable 5000Unit(s) SubCutaneous every 12 hours  dextrose 5% + sodium chloride 0.45%. 1000milliLiter(s) IV Continuous <Continuous>  nystatin Powder 1Application(s) Topical two times a day  piperacillin/tazobactam IVPB. 3.375Gram(s) IV Intermittent every 6 hours  vancomycin  IVPB  IV Intermittent   ALBUTerol/ipratropium for Nebulization 3milliLiter(s) Nebulizer every 6 hours  vancomycin  IVPB 750milliGRAM(s) IV Intermittent every 24 hours  diltiazem    Tablet 180milliGRAM(s) Oral two times a day    MEDICATIONS  (PRN):  bisacodyl 5milliGRAM(s) Oral every 12 hours PRN Constipation      LABS:                        7.6    13.3  )-----------( 299      ( 03 Feb 2017 06:34 )             23.7     03 Feb 2017 06:34    134    |  104    |  8      ----------------------------<  157    3.1     |  19     |  0.34     Ca    7.4        03 Feb 2017 06:34  Mg     2.1       03 Feb 2017 06:34    TPro  5.6    /  Alb  1.8    /  TBili  0.4    /  DBili  x      /  AST  20     /  ALT  17     /  AlkPhos  68     02 Feb 2017 06:18    PT/INR - ( 02 Feb 2017 06:18 )   PT: 13.2 sec;   INR: 1.19          PTT - ( 02 Feb 2017 06:18 )  PTT:29.0 sec    I&O's Summary  I & Os for 24h ending 03 Feb 2017 07:00  =============================================  IN: 1545 ml / OUT: 1050 ml / NET: 495 ml    I & Os for current day (as of 03 Feb 2017 19:19)  =============================================  IN: 365 ml / OUT: 500 ml / NET: -135 ml    RADIOLOGY & ADDITIONAL TESTS:

## 2017-02-03 NOTE — PROGRESS NOTE ADULT - PROBLEM SELECTOR PLAN 5
Recommendation: Pt with progressive dysphagia likely secondary to worsening mental status in setting of severe dementia and advanced illness. PGT tube placed 2/2 and feeding to be started today

## 2017-02-03 NOTE — PROGRESS NOTE ADULT - PROBLEM SELECTOR PLAN 2
WBC elevation UNCLEAR ETIOLOGY,  no sign of aspiration PNA on CXR, UA clear and decubitus ulcer appear non-infectious. Vital signs stable.  Will hold abx for now.  Wound consult for decubitus ulcers.  Dr. Rodríguez following reccs appreciated WBC elevation UNCLEAR ETIOLOGY, present since admission.  No sign of aspiration PNA on CXR, UA clear and decubitus ulcer appear non-infectious.  Post PEG a rapid was called with patient in RVR and tachypneic with apneic episodes.   - Was started on Vanc/Zosyn yesterday  - Wound consult for decubitus ulcers.  Dr. Rodríguez following recs appreciated WBC elevation UNCLEAR ETIOLOGY, present since admission.  No sign of aspiration PNA on CXR, UA clear and decubitus ulcer appear non-infectious.  Post PEG a rapid was called with patient in RVR and tachypneic with apneic episodes.   - Was started on Vanc/Zosyn 2/2     - Vanc trough on 2/5 at 17:00  - Wound consult for decubitus ulcers.  Dr. Rodríguez following recs appreciated

## 2017-02-03 NOTE — PROGRESS NOTE ADULT - PROBLEM SELECTOR PLAN 1
Patient is s/p PEG tube. She was apneic and in Afib with RVR post PEG placement.   Now more stable. Ready for Tube feeds.

## 2017-02-03 NOTE — PROGRESS NOTE ADULT - PROBLEM SELECTOR PLAN 3
Likely hypovolemic hyponatremia, will f/u with BMP after fluid resuscitation.  Will f/u with serum osmo and urine lytes. Likely hypovolemic hyponatremia, will f/u with BMP.   Will f/u with serum osmo and urine lytes.

## 2017-02-03 NOTE — PROGRESS NOTE ADULT - SUBJECTIVE AND OBJECTIVE BOX
Hospital Course:  89 yo F history of Multiple CVAs, PAFIB, admitted for poor PO intake. Patient had leukocytosis but no other signs of clear infection. Vascular commented NTD about the gangrenous toes. Palliative was consulted and patient was full code after discussion and  they wanted a PEG. Post PEG a rapid was called with patient in RVR and tachypneic with apneic episodes. Patient started on empiric antibiotics, nebulizers, and given 10 mg of diltiazem for control.  Stepped up to [facility] for further monitoring of neuro status.      Overnight Events: Inquired about code status. Patient remains FULL CODE. Decreased high flow to ~50% and she is still spo2>94%    Subjective: Patient seen and examined at bedside.    [OBJECTIVE]:    Vital Signs:  T(F): 99.2, Max: 99.8 (02-02 @ 22:00)  HR: 89 (87 - 141)  BP: 156/74 (156/74 - 182/74)  BP(mean): 113 (113 - 117)  RR: 22 (18 - 30)  SpO2: 99% (95% - 100%)  Wt(kg): --  CVP(cm H2O): --      I & Os for current day (as of 02-03 @ 07:02)  =============================================  IN: 1545 ml / OUT: 1050 ml / NET: 495 ml    CAPILLARY BLOOD GLUCOSE  149 (02 Feb 2017 15:48)    PHYSICAL EXAM:    Constitutional: NAD. Well Appearing.   Eyes: No scleral icterus. No Conj Pallor.   ENMT: MMM. Neck   Neck: No JVD  Respiratory: CTABL   Cardiovascular: Normal S1 and S2 no MRG   Gastrointestinal: BS normoactive. S/NT/ND.   Extremities: WWP. DP 2 plus.   Neurological: AAOX0. Reflexes 3 plus. Strength 5/5 upper and lower. No Dysmetria.    Medications:  MEDICATIONS  (STANDING):  atorvastatin 20milliGRAM(s) Oral at bedtime  folic acid 1milliGRAM(s) Oral daily  ascorbic acid 500milliGRAM(s) Oral daily  heparin  Injectable 5000Unit(s) SubCutaneous every 12 hours  dextrose 5% + sodium chloride 0.45%. 1000milliLiter(s) IV Continuous <Continuous>  nystatin Powder 1Application(s) Topical two times a day  diltiazem Injectable 5milliGRAM(s) IV Push once  ALBUTerol/ipratropium for Nebulization. 3milliLiter(s) Nebulizer once  piperacillin/tazobactam IVPB. 3.375Gram(s) IV Intermittent every 6 hours  vancomycin  IVPB  IV Intermittent   ALBUTerol/ipratropium for Nebulization 3milliLiter(s) Nebulizer every 6 hours  vancomycin  IVPB 750milliGRAM(s) IV Intermittent every 24 hours    MEDICATIONS  (PRN):  bisacodyl 5milliGRAM(s) Oral every 12 hours PRN Constipation      Allergies:    No Known Allergies    Intolerances        Labs:                        7.6    13.3  )-----------( 299      ( 03 Feb 2017 06:34 )             23.7     02 Feb 2017 16:03    133    |  105    |  9      ----------------------------<  143    3.8     |  19     |  0.35     Ca    8.1        02 Feb 2017 16:03  Mg     1.6       02 Feb 2017 16:03    TPro  5.6    /  Alb  1.8    /  TBili  0.4    /  DBili  x      /  AST  20     /  ALT  17     /  AlkPhos  68     02 Feb 2017 06:18    PT/INR - ( 02 Feb 2017 06:18 )   PT: 13.2 sec;   INR: 1.19          PTT - ( 02 Feb 2017 06:18 )  PTT:29.0 sec      Radiology and other tests: Hospital Course:  89 yo F history of Multiple CVAs, PAFIB, admitted for poor PO intake. Patient had leukocytosis but no other signs of clear infection. Vascular commented NTD about the gangrenous toes. Palliative was consulted and patient was full code after discussion and  they wanted a PEG. Post PEG a rapid was called with patient in RVR and tachypneic with apneic episodes. Patient started on empiric antibiotics, nebulizers, and given 10 mg of diltiazem for control.  Stepped up to [facility] for further monitoring of neuro status.      Overnight Events: Inquired about code status. Patient remains FULL CODE. Decreased high flow to ~50% and she is still spo2>94%    Subjective: Patient seen and examined at bedside.  Pt non-verbal.  Family at bedside, deny coughing, vomiting or diarrhea.      [OBJECTIVE]:    Vital Signs:  T(F): 99.2, Max: 99.8 (02-02 @ 22:00)  HR: 89 (87 - 141)  BP: 156/74 (156/74 - 182/74)  BP(mean): 113 (113 - 117)  RR: 22 (18 - 30)  SpO2: 99% (95% - 100%)  Wt(kg): --  CVP(cm H2O): --      I & Os for current day (as of 02-03 @ 07:02)  =============================================  IN: 1545 ml / OUT: 1050 ml / NET: 495 ml    CAPILLARY BLOOD GLUCOSE  149 (02 Feb 2017 15:48)    PHYSICAL EXAM:    Constitutional: NAD, chronically ill-appearing, non-verbal  Eyes: No scleral icterus, PERRL  ENMT: dry MM  Neck: No JVD  Respiratory: CTABL   Cardiovascular: Normal S1 and S2 no MRG   Gastrointestinal: BS normoactive. S/NT/ND, PEG surrounded by clean gauze  Extremities: WWP. DP 2 +  Neurological: AAOX0. Reflexes 3 plus, not following commands    Medications:  MEDICATIONS  (STANDING):  atorvastatin 20milliGRAM(s) Oral at bedtime  folic acid 1milliGRAM(s) Oral daily  ascorbic acid 500milliGRAM(s) Oral daily  heparin  Injectable 5000Unit(s) SubCutaneous every 12 hours  dextrose 5% + sodium chloride 0.45%. 1000milliLiter(s) IV Continuous <Continuous>  nystatin Powder 1Application(s) Topical two times a day  diltiazem Injectable 5milliGRAM(s) IV Push once  ALBUTerol/ipratropium for Nebulization. 3milliLiter(s) Nebulizer once  piperacillin/tazobactam IVPB. 3.375Gram(s) IV Intermittent every 6 hours  vancomycin  IVPB  IV Intermittent   ALBUTerol/ipratropium for Nebulization 3milliLiter(s) Nebulizer every 6 hours  vancomycin  IVPB 750milliGRAM(s) IV Intermittent every 24 hours    MEDICATIONS  (PRN):  bisacodyl 5milliGRAM(s) Oral every 12 hours PRN Constipation      Allergies:    No Known Allergies    Intolerances        Labs:                        7.6    13.3  )-----------( 299      ( 03 Feb 2017 06:34 )             23.7     02 Feb 2017 16:03    133    |  105    |  9      ----------------------------<  143    3.8     |  19     |  0.35     Ca    8.1        02 Feb 2017 16:03  Mg     1.6       02 Feb 2017 16:03    TPro  5.6    /  Alb  1.8    /  TBili  0.4    /  DBili  x      /  AST  20     /  ALT  17     /  AlkPhos  68     02 Feb 2017 06:18    PT/INR - ( 02 Feb 2017 06:18 )   PT: 13.2 sec;   INR: 1.19          PTT - ( 02 Feb 2017 06:18 )  PTT:29.0 sec      Radiology and other tests: Hospital Course:  89 yo F history of Multiple CVAs, PAFIB, admitted for poor PO intake. Patient had leukocytosis but no other signs of clear infection. Vascular commented NTD about the gangrenous toes. Palliative was consulted and patient was full code after discussion and  they wanted a PEG. Post PEG a rapid was called with patient in RVR and tachypneic with apneic episodes. Patient started on empiric antibiotics, nebulizers, and given 10 mg of diltiazem for control.  Stepped up to [facility] for further monitoring of neuro status.  Continues to be on HFNC.  Started on feeds through PEG tube.     Overnight Events: Inquired about code status. Patient remains FULL CODE. Decreased high flow to ~50% and she is still spo2>94%    Subjective: Patient seen and examined at bedside.  Pt non-verbal.  Family at bedside, deny coughing, vomiting or diarrhea.      [OBJECTIVE]:    Vital Signs:  T(F): 99.2, Max: 99.8 (02-02 @ 22:00)  HR: 89 (87 - 141)  BP: 156/74 (156/74 - 182/74)  BP(mean): 113 (113 - 117)  RR: 22 (18 - 30)  SpO2: 99% (95% - 100%)  Wt(kg): --  CVP(cm H2O): --      I & Os for current day (as of 02-03 @ 07:02)  =============================================  IN: 1545 ml / OUT: 1050 ml / NET: 495 ml    CAPILLARY BLOOD GLUCOSE  149 (02 Feb 2017 15:48)    PHYSICAL EXAM:    Constitutional: NAD, chronically ill-appearing, non-verbal  Eyes: No scleral icterus, PERRL  ENMT: dry MM  Neck: No JVD  Respiratory: CTABL   Cardiovascular: Normal S1 and S2 no MRG   Gastrointestinal: BS normoactive. S/NT/ND, PEG surrounded by clean gauze  Extremities: WWP. DP 2 +  Neurological: AAOX0. Reflexes 3 plus, not following commands    Medications:  MEDICATIONS  (STANDING):  atorvastatin 20milliGRAM(s) Oral at bedtime  folic acid 1milliGRAM(s) Oral daily  ascorbic acid 500milliGRAM(s) Oral daily  heparin  Injectable 5000Unit(s) SubCutaneous every 12 hours  dextrose 5% + sodium chloride 0.45%. 1000milliLiter(s) IV Continuous <Continuous>  nystatin Powder 1Application(s) Topical two times a day  diltiazem Injectable 5milliGRAM(s) IV Push once  ALBUTerol/ipratropium for Nebulization. 3milliLiter(s) Nebulizer once  piperacillin/tazobactam IVPB. 3.375Gram(s) IV Intermittent every 6 hours  vancomycin  IVPB  IV Intermittent   ALBUTerol/ipratropium for Nebulization 3milliLiter(s) Nebulizer every 6 hours  vancomycin  IVPB 750milliGRAM(s) IV Intermittent every 24 hours    MEDICATIONS  (PRN):  bisacodyl 5milliGRAM(s) Oral every 12 hours PRN Constipation      Allergies:    No Known Allergies    Intolerances        Labs:                        7.6    13.3  )-----------( 299      ( 03 Feb 2017 06:34 )             23.7     02 Feb 2017 16:03    133    |  105    |  9      ----------------------------<  143    3.8     |  19     |  0.35     Ca    8.1        02 Feb 2017 16:03  Mg     1.6       02 Feb 2017 16:03    TPro  5.6    /  Alb  1.8    /  TBili  0.4    /  DBili  x      /  AST  20     /  ALT  17     /  AlkPhos  68     02 Feb 2017 06:18    PT/INR - ( 02 Feb 2017 06:18 )   PT: 13.2 sec;   INR: 1.19          PTT - ( 02 Feb 2017 06:18 )  PTT:29.0 sec      Radiology and other tests: Hospital Course:  89 yo F history of Multiple CVAs, PAFIB, admitted for poor PO intake. Patient had leukocytosis but no other signs of clear infection. Vascular commented NTD about the gangrenous toes. Palliative was consulted and patient was full code after discussion and  they wanted a PEG. Post PEG a rapid was called with patient in RVR and tachypneic with apneic episodes. Patient started on empiric antibiotics, nebulizers, and given 10 mg of diltiazem for control.  Stepped up to [facility] for further monitoring of neuro status.  She was initially requiring HFNC, but was later satting >95% on 3L. Started on feeds through PEG tube.  She clinically improved and was deemed stable for SD to RMF.     Overnight Events: Inquired about code status. Patient remains FULL CODE. Decreased high flow to ~50% and she is still spo2>94%    Subjective: Patient seen and examined at bedside.  Pt non-verbal.  Family at bedside, deny coughing, vomiting or diarrhea.      [OBJECTIVE]:    Vital Signs:  T(F): 99.2, Max: 99.8 (02-02 @ 22:00)  HR: 89 (87 - 141)  BP: 156/74 (156/74 - 182/74)  BP(mean): 113 (113 - 117)  RR: 22 (18 - 30)  SpO2: 99% (95% - 100%)  Wt(kg): --  CVP(cm H2O): --      I & Os for current day (as of 02-03 @ 07:02)  =============================================  IN: 1545 ml / OUT: 1050 ml / NET: 495 ml    CAPILLARY BLOOD GLUCOSE  149 (02 Feb 2017 15:48)    PHYSICAL EXAM:    Constitutional: NAD, chronically ill-appearing, non-verbal  Eyes: No scleral icterus, PERRL  ENMT: dry MM  Neck: No JVD  Respiratory: CTABL   Cardiovascular: Normal S1 and S2 no MRG   Gastrointestinal: BS normoactive. S/NT/ND, PEG surrounded by clean gauze  Extremities: WWP. DP 2 +  Neurological: unable to assess orientation as pt nonverbal. Reflexes 3 plus, not following commands    Medications:  MEDICATIONS  (STANDING):  atorvastatin 20milliGRAM(s) Oral at bedtime  folic acid 1milliGRAM(s) Oral daily  ascorbic acid 500milliGRAM(s) Oral daily  heparin  Injectable 5000Unit(s) SubCutaneous every 12 hours  dextrose 5% + sodium chloride 0.45%. 1000milliLiter(s) IV Continuous <Continuous>  nystatin Powder 1Application(s) Topical two times a day  diltiazem Injectable 5milliGRAM(s) IV Push once  ALBUTerol/ipratropium for Nebulization. 3milliLiter(s) Nebulizer once  piperacillin/tazobactam IVPB. 3.375Gram(s) IV Intermittent every 6 hours  vancomycin  IVPB  IV Intermittent   ALBUTerol/ipratropium for Nebulization 3milliLiter(s) Nebulizer every 6 hours  vancomycin  IVPB 750milliGRAM(s) IV Intermittent every 24 hours    MEDICATIONS  (PRN):  bisacodyl 5milliGRAM(s) Oral every 12 hours PRN Constipation      Allergies:    No Known Allergies    Intolerances        Labs:                        7.6    13.3  )-----------( 299      ( 03 Feb 2017 06:34 )             23.7     02 Feb 2017 16:03    133    |  105    |  9      ----------------------------<  143    3.8     |  19     |  0.35     Ca    8.1        02 Feb 2017 16:03  Mg     1.6       02 Feb 2017 16:03    TPro  5.6    /  Alb  1.8    /  TBili  0.4    /  DBili  x      /  AST  20     /  ALT  17     /  AlkPhos  68     02 Feb 2017 06:18    PT/INR - ( 02 Feb 2017 06:18 )   PT: 13.2 sec;   INR: 1.19          PTT - ( 02 Feb 2017 06:18 )  PTT:29.0 sec      Radiology and other tests: Hospital Course:  89 yo F history of Multiple CVAs, PAFIB, admitted for poor PO intake. Patient had leukocytosis but no other signs of clear infection. Vascular commented NTD about the gangrenous toes. Palliative was consulted and patient was full code after discussion and  they wanted a PEG. Post PEG a rapid was called with patient in RVR and tachypneic with apneic episodes. Patient started on empiric antibiotics for suspected aspiration PNA, nebulizers, and given 10 mg of diltiazem for control.  Stepped up to [facility] for further monitoring of neuro status.  She was initially requiring HFNC, but was later satting >95% on 3L. Started on feeds through PEG tube.  She clinically improved and was deemed stable for SD to RMF.     Overnight Events: Inquired about code status. Patient remains FULL CODE. Decreased high flow to ~50% and she is still spo2>94%    Subjective: Patient seen and examined at bedside.  Pt non-verbal.  Family at bedside, deny coughing, vomiting or diarrhea.      [OBJECTIVE]:    Vital Signs:  T(F): 99.2, Max: 99.8 (02-02 @ 22:00)  HR: 89 (87 - 141)  BP: 156/74 (156/74 - 182/74)  BP(mean): 113 (113 - 117)  RR: 22 (18 - 30)  SpO2: 99% (95% - 100%)  Wt(kg): --  CVP(cm H2O): --      I & Os for current day (as of 02-03 @ 07:02)  =============================================  IN: 1545 ml / OUT: 1050 ml / NET: 495 ml    CAPILLARY BLOOD GLUCOSE  149 (02 Feb 2017 15:48)    PHYSICAL EXAM:    Constitutional: NAD, chronically ill-appearing, non-verbal  Eyes: No scleral icterus, PERRL  ENMT: dry MM  Neck: No JVD  Respiratory: CTABL   Cardiovascular: Normal S1 and S2 no MRG   Gastrointestinal: BS normoactive. S/NT/ND, PEG surrounded by clean gauze  Extremities: WWP. DP 2 +  Neurological: unable to assess orientation as pt nonverbal. Reflexes 3 plus, not following commands    Medications:  MEDICATIONS  (STANDING):  atorvastatin 20milliGRAM(s) Oral at bedtime  folic acid 1milliGRAM(s) Oral daily  ascorbic acid 500milliGRAM(s) Oral daily  heparin  Injectable 5000Unit(s) SubCutaneous every 12 hours  dextrose 5% + sodium chloride 0.45%. 1000milliLiter(s) IV Continuous <Continuous>  nystatin Powder 1Application(s) Topical two times a day  diltiazem Injectable 5milliGRAM(s) IV Push once  ALBUTerol/ipratropium for Nebulization. 3milliLiter(s) Nebulizer once  piperacillin/tazobactam IVPB. 3.375Gram(s) IV Intermittent every 6 hours  vancomycin  IVPB  IV Intermittent   ALBUTerol/ipratropium for Nebulization 3milliLiter(s) Nebulizer every 6 hours  vancomycin  IVPB 750milliGRAM(s) IV Intermittent every 24 hours    MEDICATIONS  (PRN):  bisacodyl 5milliGRAM(s) Oral every 12 hours PRN Constipation      Allergies:    No Known Allergies    Intolerances        Labs:                        7.6    13.3  )-----------( 299      ( 03 Feb 2017 06:34 )             23.7     02 Feb 2017 16:03    133    |  105    |  9      ----------------------------<  143    3.8     |  19     |  0.35     Ca    8.1        02 Feb 2017 16:03  Mg     1.6       02 Feb 2017 16:03    TPro  5.6    /  Alb  1.8    /  TBili  0.4    /  DBili  x      /  AST  20     /  ALT  17     /  AlkPhos  68     02 Feb 2017 06:18    PT/INR - ( 02 Feb 2017 06:18 )   PT: 13.2 sec;   INR: 1.19          PTT - ( 02 Feb 2017 06:18 )  PTT:29.0 sec      Radiology and other tests:

## 2017-02-03 NOTE — PROGRESS NOTE ADULT - PROBLEM SELECTOR PLAN 4
With toes cyanosis, poor candidate for any type of intervention per vascular surgery.  Will continue to monitor. - Restart home Diltiazem 180 mg BID via PEG

## 2017-02-04 LAB
ANION GAP SERPL CALC-SCNC: 11 MMOL/L — SIGNIFICANT CHANGE UP (ref 9–16)
BASOPHILS NFR BLD AUTO: 0.4 % — SIGNIFICANT CHANGE UP (ref 0–2)
BUN SERPL-MCNC: 7 MG/DL — SIGNIFICANT CHANGE UP (ref 7–23)
CALCIUM SERPL-MCNC: 7.8 MG/DL — LOW (ref 8.5–10.5)
CHLORIDE SERPL-SCNC: 102 MMOL/L — SIGNIFICANT CHANGE UP (ref 96–108)
CO2 SERPL-SCNC: 18 MMOL/L — LOW (ref 22–31)
CREAT SERPL-MCNC: 0.38 MG/DL — LOW (ref 0.5–1.3)
EOSINOPHIL NFR BLD AUTO: 1 % — SIGNIFICANT CHANGE UP (ref 0–6)
GLUCOSE SERPL-MCNC: 140 MG/DL — HIGH (ref 70–99)
HBA1C BLD-MCNC: 5.3 % — SIGNIFICANT CHANGE UP (ref 4.8–5.6)
HCT VFR BLD CALC: 23.7 % — LOW (ref 34.5–45)
HGB BLD-MCNC: 7.7 G/DL — LOW (ref 11.5–15.5)
LYMPHOCYTES # BLD AUTO: 6.4 % — LOW (ref 13–44)
MAGNESIUM SERPL-MCNC: 2 MG/DL — SIGNIFICANT CHANGE UP (ref 1.6–2.4)
MCHC RBC-ENTMCNC: 23.3 PG — LOW (ref 27–34)
MCHC RBC-ENTMCNC: 32.5 G/DL — SIGNIFICANT CHANGE UP (ref 32–36)
MCV RBC AUTO: 71.8 FL — LOW (ref 80–100)
MONOCYTES NFR BLD AUTO: 6.3 % — SIGNIFICANT CHANGE UP (ref 2–14)
NEUTROPHILS NFR BLD AUTO: 85.9 % — HIGH (ref 43–77)
OSMOLALITY UR: 379 MOSMOL/KG — SIGNIFICANT CHANGE UP (ref 100–650)
PLATELET # BLD AUTO: 282 K/UL — SIGNIFICANT CHANGE UP (ref 150–400)
POTASSIUM SERPL-MCNC: 3.4 MMOL/L — LOW (ref 3.5–5.3)
POTASSIUM SERPL-SCNC: 3.4 MMOL/L — LOW (ref 3.5–5.3)
RBC # BLD: 3.3 M/UL — LOW (ref 3.8–5.2)
RBC # FLD: 18.1 % — HIGH (ref 10.3–16.9)
SODIUM SERPL-SCNC: 131 MMOL/L — LOW (ref 135–145)
SODIUM UR-SCNC: 82 MMOL/L — SIGNIFICANT CHANGE UP
TSH SERPL-MCNC: 0.07 UIU/ML — LOW (ref 0.35–4.94)
WBC # BLD: 14.8 K/UL — HIGH (ref 3.8–10.5)
WBC # FLD AUTO: 14.8 K/UL — HIGH (ref 3.8–10.5)

## 2017-02-04 RX ORDER — PIPERACILLIN AND TAZOBACTAM 4; .5 G/20ML; G/20ML
3.38 INJECTION, POWDER, LYOPHILIZED, FOR SOLUTION INTRAVENOUS EVERY 6 HOURS
Qty: 0 | Refills: 0 | Status: DISCONTINUED | OUTPATIENT
Start: 2017-02-04 | End: 2017-02-08

## 2017-02-04 RX ORDER — POTASSIUM CHLORIDE 20 MEQ
10 PACKET (EA) ORAL
Qty: 0 | Refills: 0 | Status: COMPLETED | OUTPATIENT
Start: 2017-02-04 | End: 2017-02-04

## 2017-02-04 RX ORDER — INSULIN HUMAN 100 [IU]/ML
INJECTION, SOLUTION SUBCUTANEOUS EVERY 6 HOURS
Qty: 0 | Refills: 0 | Status: DISCONTINUED | OUTPATIENT
Start: 2017-02-04 | End: 2017-02-09

## 2017-02-04 RX ADMIN — NYSTATIN CREAM 1 APPLICATION(S): 100000 CREAM TOPICAL at 17:58

## 2017-02-04 RX ADMIN — SODIUM CHLORIDE 80 MILLILITER(S): 9 INJECTION, SOLUTION INTRAVENOUS at 00:48

## 2017-02-04 RX ADMIN — Medication 250 MILLIGRAM(S): at 17:49

## 2017-02-04 RX ADMIN — Medication 3 MILLILITER(S): at 06:50

## 2017-02-04 RX ADMIN — PIPERACILLIN AND TAZOBACTAM 200 GRAM(S): 4; .5 INJECTION, POWDER, LYOPHILIZED, FOR SOLUTION INTRAVENOUS at 17:49

## 2017-02-04 RX ADMIN — PIPERACILLIN AND TAZOBACTAM 200 GRAM(S): 4; .5 INJECTION, POWDER, LYOPHILIZED, FOR SOLUTION INTRAVENOUS at 00:43

## 2017-02-04 RX ADMIN — INSULIN HUMAN 1: 100 INJECTION, SOLUTION SUBCUTANEOUS at 17:50

## 2017-02-04 RX ADMIN — PIPERACILLIN AND TAZOBACTAM 200 GRAM(S): 4; .5 INJECTION, POWDER, LYOPHILIZED, FOR SOLUTION INTRAVENOUS at 06:51

## 2017-02-04 RX ADMIN — Medication 3 MILLILITER(S): at 12:59

## 2017-02-04 RX ADMIN — Medication 3 MILLILITER(S): at 23:24

## 2017-02-04 RX ADMIN — PIPERACILLIN AND TAZOBACTAM 200 GRAM(S): 4; .5 INJECTION, POWDER, LYOPHILIZED, FOR SOLUTION INTRAVENOUS at 23:24

## 2017-02-04 RX ADMIN — Medication 500 MILLIGRAM(S): at 12:59

## 2017-02-04 RX ADMIN — HEPARIN SODIUM 5000 UNIT(S): 5000 INJECTION INTRAVENOUS; SUBCUTANEOUS at 06:50

## 2017-02-04 RX ADMIN — Medication 100 MILLIEQUIVALENT(S): at 10:38

## 2017-02-04 RX ADMIN — Medication 3 MILLILITER(S): at 00:44

## 2017-02-04 RX ADMIN — INSULIN HUMAN 2: 100 INJECTION, SOLUTION SUBCUTANEOUS at 12:59

## 2017-02-04 RX ADMIN — Medication 1 MILLIGRAM(S): at 12:59

## 2017-02-04 RX ADMIN — NYSTATIN CREAM 1 APPLICATION(S): 100000 CREAM TOPICAL at 06:52

## 2017-02-04 RX ADMIN — ATORVASTATIN CALCIUM 20 MILLIGRAM(S): 80 TABLET, FILM COATED ORAL at 21:59

## 2017-02-04 RX ADMIN — PIPERACILLIN AND TAZOBACTAM 200 GRAM(S): 4; .5 INJECTION, POWDER, LYOPHILIZED, FOR SOLUTION INTRAVENOUS at 13:00

## 2017-02-04 RX ADMIN — Medication 3 MILLILITER(S): at 17:49

## 2017-02-04 RX ADMIN — Medication 100 MILLIEQUIVALENT(S): at 08:57

## 2017-02-04 RX ADMIN — HEPARIN SODIUM 5000 UNIT(S): 5000 INJECTION INTRAVENOUS; SUBCUTANEOUS at 17:49

## 2017-02-04 NOTE — PROGRESS NOTE ADULT - PROBLEM SELECTOR PLAN 1
Patient is s/p PEG tube. She was apneic and in Afib with RVR post PEG placement.   Now more stable.  - started tube feeds today

## 2017-02-04 NOTE — PROGRESS NOTE ADULT - PROBLEM SELECTOR PLAN 3
present since admission - WBC elevation UNCLEAR ETIOLOGY, possibly in the setting of starting tubefeeding.  No sign of aspiration PNA on CXR, UA clear and decubitus ulcer appear non-infectious.  Post PEG a rapid was called with patient in RVR and tachypneic with apneic episodes.   - c/w Vanc/Zosyn 3/7  - f/u Vanc trough on 2/5 at 17:00  - f/u Wound consult for decubitus ulcers  - f/u ID (Dr. Rodríguez) following recs appreciated

## 2017-02-04 NOTE — PROGRESS NOTE ADULT - SUBJECTIVE AND OBJECTIVE BOX
OVERNIGHT EVENTS: TITI    SUBJECTIVE: NAD    VITAL SIGNS: Last 24 Hrs  T(F): 99.3, Max: 99.3 (02-04 @ 09:53)  HR: 74 (74 - 106)  BP: 138/68 (138/68 - 180/90)  RR: 20 (20 - 33)  SpO2: 93% (93% - 100%)    CAPILLARY BLOOD GLUCOSE  206 (11:37)  182 (07:18)    PHYSICAL EXAM:  Constitutional: NAD. Well Appearing.   HEENT: Scleral icterus. No Conjunctival Pallor, MMM. Neck   Respiratory: CTABL, no wheezes, no rhonchi   Cardiovascular: Irregular rhythm, S1, S2  Gastrointestinal: BS normoactive. S/NT/ND  Extremities: WWP. Stage 4 sacrald ecubitus ulcer, blue dry gangrnenous toes, several pressure ulcer.   Neurological: AAOX3. CN II- XII intact. Reflexes 3 plus. Strength 5/5 upper and lower. No Dysmetria.    MEDICATIONS  (STANDING):  atorvastatin 20milliGRAM(s) Oral at bedtime  folic acid 1milliGRAM(s) Oral daily  ascorbic acid 500milliGRAM(s) Oral daily  heparin  Injectable 5000Unit(s) SubCutaneous every 12 hours  dextrose 5% + sodium chloride 0.45%. 1000milliLiter(s) IV Continuous <Continuous>  nystatin Powder 1Application(s) Topical two times a day  vancomycin  IVPB  IV Intermittent   ALBUTerol/ipratropium for Nebulization 3milliLiter(s) Nebulizer every 6 hours  vancomycin  IVPB 750milliGRAM(s) IV Intermittent every 24 hours  diltiazem    Tablet 180milliGRAM(s) Oral two times a day  piperacillin/tazobactam IVPB. 3.375Gram(s) IV Intermittent every 6 hours  insulin regular  human corrective regimen sliding scale  SubCutaneous every 6 hours    MEDICATIONS  (PRN):  bisacodyl 5milliGRAM(s) Oral every 12 hours PRN Constipation    ALLERGIES: No Known Allergies    LABS: ( 04 Feb 2017 10:53 )                        7.7    14.8  )-----------( 282                   23.7     131    |  102    |  7      ----------------------------<  140    3.4     |  18     |  0.38     Ca    7.8       Mg     2.0

## 2017-02-04 NOTE — PROGRESS NOTE ADULT - PROBLEM SELECTOR PLAN 4
Likely hypovolemic hyponatremia, Urine Osm 379, however, urine Na 82 - elevated - likely in the setting of SIADH vs. hypothyroidsm vs. adrenal insufficiency  - f/u TSH, am Cortisol

## 2017-02-04 NOTE — PROGRESS NOTE ADULT - SUBJECTIVE AND OBJECTIVE BOX
Pt seen and examined  Stepped down, respiratory status better, peg feedings in progress    REVIEW OF SYSTEMS:  patient unable to give      MEDICATIONS:  MEDICATIONS  (STANDING):  atorvastatin 20milliGRAM(s) Oral at bedtime  folic acid 1milliGRAM(s) Oral daily  ascorbic acid 500milliGRAM(s) Oral daily  heparin  Injectable 5000Unit(s) SubCutaneous every 12 hours  dextrose 5% + sodium chloride 0.45%. 1000milliLiter(s) IV Continuous <Continuous>  nystatin Powder 1Application(s) Topical two times a day  vancomycin  IVPB  IV Intermittent   ALBUTerol/ipratropium for Nebulization 3milliLiter(s) Nebulizer every 6 hours  vancomycin  IVPB 750milliGRAM(s) IV Intermittent every 24 hours  diltiazem    Tablet 180milliGRAM(s) Oral two times a day  piperacillin/tazobactam IVPB. 3.375Gram(s) IV Intermittent every 6 hours  insulin regular  human corrective regimen sliding scale  SubCutaneous every 6 hours    MEDICATIONS  (PRN):  bisacodyl 5milliGRAM(s) Oral every 12 hours PRN Constipation      Allergies    No Known Allergies    Intolerances        Vital Signs Last 24 Hrs  T(C): 37.4, Max: 37.4 (02-04 @ 09:53)  T(F): 99.3, Max: 99.3 (02-04 @ 09:53)  HR: 74 (74 - 106)  BP: 138/68 (138/68 - 180/90)  BP(mean): 115 (115 - 122)  RR: 20 (20 - 33)  SpO2: 93% (93% - 100%)  I & Os for 24h ending 02-04 @ 07:00  =============================================  IN: 1055 ml / OUT: 950 ml / NET: 105 ml    I & Os for current day (as of 02-04 @ 11:54)  =============================================  IN: 200 ml / OUT: 0 ml / NET: 200 ml      PHYSICAL EXAM:    General: on nasal cannula, in no acute distress  HEENT: MMM, conjunctiva and sclera clear  Lungs: poor inspiratory effort  Heart: regular  Gastrointestinal: Soft non-tender non-distended; Normal bowel sounds; No hepatosplenomegaly  Skin: Warm and dry. No obvious rash  Ext: no change    LABS:      CBC Full  -  ( 04 Feb 2017 10:53 )  WBC Count : 14.8 K/uL  Hemoglobin : 7.7 g/dL  Hematocrit : 23.7 %  Platelet Count - Automated : 282 K/uL  Mean Cell Volume : 71.8 fL  Mean Cell Hemoglobin : 23.3 pg  Mean Cell Hemoglobin Concentration : 32.5 g/dL  Auto Neutrophil # : x  Auto Lymphocyte # : x  Auto Monocyte # : x  Auto Eosinophil # : x  Auto Basophil # : x  Auto Neutrophil % : 85.9 %  Auto Lymphocyte % : 6.4 %  Auto Monocyte % : 6.3 %  Auto Eosinophil % : 1.0 %  Auto Basophil % : 0.4 %    04 Feb 2017 07:07    131    |  102    |  7      ----------------------------<  140    3.4     |  18     |  0.38     Ca    7.8        04 Feb 2017 07:07  Mg     2.0       04 Feb 2017 07:07                        RADIOLOGY & ADDITIONAL STUDIES (The following images were personally reviewed):

## 2017-02-04 NOTE — PROGRESS NOTE ADULT - PROBLEM SELECTOR PLAN 2
Post procedure patient was apneic with a cough with air bronchograms seen on CXR.   - c/w  vancomycin/zosyn - day 3/7

## 2017-02-05 LAB
ANION GAP SERPL CALC-SCNC: 10 MMOL/L — SIGNIFICANT CHANGE UP (ref 9–16)
BLD GP AB SCN SERPL QL: NEGATIVE — SIGNIFICANT CHANGE UP
BUN SERPL-MCNC: 13 MG/DL — SIGNIFICANT CHANGE UP (ref 7–23)
CALCIUM SERPL-MCNC: 7.3 MG/DL — LOW (ref 8.5–10.5)
CHLORIDE SERPL-SCNC: 102 MMOL/L — SIGNIFICANT CHANGE UP (ref 96–108)
CO2 SERPL-SCNC: 20 MMOL/L — LOW (ref 22–31)
CREAT SERPL-MCNC: 0.27 MG/DL — LOW (ref 0.5–1.3)
GLUCOSE SERPL-MCNC: 131 MG/DL — HIGH (ref 70–99)
HAPTOGLOB SERPL-MCNC: 305 MG/DL — HIGH (ref 31–207)
HCT VFR BLD CALC: 19 % — CRITICAL LOW (ref 34.5–45)
HCT VFR BLD CALC: 19.3 % — CRITICAL LOW (ref 34.5–45)
HGB BLD-MCNC: 6.3 G/DL — CRITICAL LOW (ref 11.5–15.5)
HGB BLD-MCNC: 6.4 G/DL — CRITICAL LOW (ref 11.5–15.5)
LDH SERPL L TO P-CCNC: 143 U/L — SIGNIFICANT CHANGE UP (ref 84–246)
MAGNESIUM SERPL-MCNC: 1.8 MG/DL — SIGNIFICANT CHANGE UP (ref 1.6–2.4)
MCHC RBC-ENTMCNC: 23.3 PG — LOW (ref 27–34)
MCHC RBC-ENTMCNC: 23.4 PG — LOW (ref 27–34)
MCHC RBC-ENTMCNC: 33.2 G/DL — SIGNIFICANT CHANGE UP (ref 32–36)
MCHC RBC-ENTMCNC: 33.2 G/DL — SIGNIFICANT CHANGE UP (ref 32–36)
MCV RBC AUTO: 70.2 FL — LOW (ref 80–100)
MCV RBC AUTO: 70.6 FL — LOW (ref 80–100)
PLATELET # BLD AUTO: 318 K/UL — SIGNIFICANT CHANGE UP (ref 150–400)
PLATELET # BLD AUTO: 342 K/UL — SIGNIFICANT CHANGE UP (ref 150–400)
POTASSIUM SERPL-MCNC: 3.9 MMOL/L — SIGNIFICANT CHANGE UP (ref 3.5–5.3)
POTASSIUM SERPL-SCNC: 3.9 MMOL/L — SIGNIFICANT CHANGE UP (ref 3.5–5.3)
RBC # BLD: 2.7 M/UL — LOW (ref 3.8–5.2)
RBC # BLD: 2.75 M/UL — LOW (ref 3.8–5.2)
RBC # FLD: 17.7 % — HIGH (ref 10.3–16.9)
RBC # FLD: 18.1 % — HIGH (ref 10.3–16.9)
RH IG SCN BLD-IMP: POSITIVE — SIGNIFICANT CHANGE UP
SODIUM SERPL-SCNC: 132 MMOL/L — LOW (ref 135–145)
T3FREE SERPL-MCNC: 1.06 PG/ML — LOW (ref 1.71–3.71)
T4 FREE SERPL-MCNC: 1.15 NG/DL — SIGNIFICANT CHANGE UP (ref 0.7–1.48)
VANCOMYCIN TROUGH SERPL-MCNC: 7.7 UG/ML — LOW (ref 10–20)
WBC # BLD: 12.9 K/UL — HIGH (ref 3.8–10.5)
WBC # BLD: 13.8 K/UL — HIGH (ref 3.8–10.5)
WBC # FLD AUTO: 12.9 K/UL — HIGH (ref 3.8–10.5)
WBC # FLD AUTO: 13.8 K/UL — HIGH (ref 3.8–10.5)

## 2017-02-05 RX ORDER — MAGNESIUM SULFATE 500 MG/ML
2 VIAL (ML) INJECTION ONCE
Qty: 0 | Refills: 0 | Status: COMPLETED | OUTPATIENT
Start: 2017-02-05 | End: 2017-02-05

## 2017-02-05 RX ORDER — VANCOMYCIN HCL 1 G
VIAL (EA) INTRAVENOUS
Qty: 0 | Refills: 0 | Status: DISCONTINUED | OUTPATIENT
Start: 2017-02-05 | End: 2017-02-06

## 2017-02-05 RX ORDER — VANCOMYCIN HCL 1 G
1000 VIAL (EA) INTRAVENOUS ONCE
Qty: 0 | Refills: 0 | Status: COMPLETED | OUTPATIENT
Start: 2017-02-05 | End: 2017-02-05

## 2017-02-05 RX ORDER — VANCOMYCIN HCL 1 G
1000 VIAL (EA) INTRAVENOUS EVERY 24 HOURS
Qty: 0 | Refills: 0 | Status: DISCONTINUED | OUTPATIENT
Start: 2017-02-06 | End: 2017-02-06

## 2017-02-05 RX ADMIN — PIPERACILLIN AND TAZOBACTAM 200 GRAM(S): 4; .5 INJECTION, POWDER, LYOPHILIZED, FOR SOLUTION INTRAVENOUS at 23:10

## 2017-02-05 RX ADMIN — Medication 50 GRAM(S): at 18:50

## 2017-02-05 RX ADMIN — NYSTATIN CREAM 1 APPLICATION(S): 100000 CREAM TOPICAL at 05:34

## 2017-02-05 RX ADMIN — INSULIN HUMAN 1: 100 INJECTION, SOLUTION SUBCUTANEOUS at 12:14

## 2017-02-05 RX ADMIN — Medication 1 MILLIGRAM(S): at 12:15

## 2017-02-05 RX ADMIN — INSULIN HUMAN 1: 100 INJECTION, SOLUTION SUBCUTANEOUS at 07:22

## 2017-02-05 RX ADMIN — Medication 250 MILLIGRAM(S): at 18:50

## 2017-02-05 RX ADMIN — INSULIN HUMAN 1: 100 INJECTION, SOLUTION SUBCUTANEOUS at 17:51

## 2017-02-05 RX ADMIN — INSULIN HUMAN 1: 100 INJECTION, SOLUTION SUBCUTANEOUS at 01:07

## 2017-02-05 RX ADMIN — HEPARIN SODIUM 5000 UNIT(S): 5000 INJECTION INTRAVENOUS; SUBCUTANEOUS at 17:52

## 2017-02-05 RX ADMIN — INSULIN HUMAN 1: 100 INJECTION, SOLUTION SUBCUTANEOUS at 23:56

## 2017-02-05 RX ADMIN — ATORVASTATIN CALCIUM 20 MILLIGRAM(S): 80 TABLET, FILM COATED ORAL at 21:54

## 2017-02-05 RX ADMIN — PIPERACILLIN AND TAZOBACTAM 200 GRAM(S): 4; .5 INJECTION, POWDER, LYOPHILIZED, FOR SOLUTION INTRAVENOUS at 17:52

## 2017-02-05 RX ADMIN — Medication 3 MILLILITER(S): at 05:33

## 2017-02-05 RX ADMIN — Medication 3 MILLILITER(S): at 23:10

## 2017-02-05 RX ADMIN — NYSTATIN CREAM 1 APPLICATION(S): 100000 CREAM TOPICAL at 17:53

## 2017-02-05 RX ADMIN — Medication 3 MILLILITER(S): at 12:14

## 2017-02-05 RX ADMIN — HEPARIN SODIUM 5000 UNIT(S): 5000 INJECTION INTRAVENOUS; SUBCUTANEOUS at 05:33

## 2017-02-05 RX ADMIN — PIPERACILLIN AND TAZOBACTAM 200 GRAM(S): 4; .5 INJECTION, POWDER, LYOPHILIZED, FOR SOLUTION INTRAVENOUS at 05:33

## 2017-02-05 RX ADMIN — Medication 3 MILLILITER(S): at 17:51

## 2017-02-05 RX ADMIN — Medication 500 MILLIGRAM(S): at 12:15

## 2017-02-05 RX ADMIN — PIPERACILLIN AND TAZOBACTAM 200 GRAM(S): 4; .5 INJECTION, POWDER, LYOPHILIZED, FOR SOLUTION INTRAVENOUS at 12:14

## 2017-02-05 NOTE — PROGRESS NOTE ADULT - SUBJECTIVE AND OBJECTIVE BOX
Pt seen and examined no change    REVIEW OF SYSTEMS:  patient unable to give but looks comfortable      MEDICATIONS:  MEDICATIONS  (STANDING):  atorvastatin 20milliGRAM(s) Oral at bedtime  folic acid 1milliGRAM(s) Oral daily  ascorbic acid 500milliGRAM(s) Oral daily  heparin  Injectable 5000Unit(s) SubCutaneous every 12 hours  nystatin Powder 1Application(s) Topical two times a day  vancomycin  IVPB  IV Intermittent   ALBUTerol/ipratropium for Nebulization 3milliLiter(s) Nebulizer every 6 hours  vancomycin  IVPB 750milliGRAM(s) IV Intermittent every 24 hours  diltiazem    Tablet 180milliGRAM(s) Oral two times a day  piperacillin/tazobactam IVPB. 3.375Gram(s) IV Intermittent every 6 hours  insulin regular  human corrective regimen sliding scale  SubCutaneous every 6 hours    MEDICATIONS  (PRN):  bisacodyl 5milliGRAM(s) Oral every 12 hours PRN Constipation      Allergies    No Known Allergies    Intolerances        Vital Signs Last 24 Hrs  T(C): 36.6, Max: 37.1 (02-05 @ 05:28)  T(F): 97.9, Max: 98.7 (02-05 @ 05:28)  HR: 68 (66 - 82)  BP: 138/68 (135/63 - 169/74)  BP(mean): --  RR: 20 (16 - 20)  SpO2: 99% (95% - 100%)    I & Os for current day (as of 02-05 @ 11:10)  =============================================  IN: 1410 ml / OUT: 300 ml / NET: 1110 ml      PHYSICAL EXAM:    General: Well developed; well nourished; in no acute distress  HEENT: MMM, conjunctiva and sclera clear  Lungs: decreased breath sounds  Heart: regular  Gastrointestinal: Soft non-tender non-distended; Normal bowel sounds; No hepatosplenomegaly  Skin: Warm and dry. No obvious rash  Ext Gangene    LABS:      CBC Full  -  ( 04 Feb 2017 10:53 )  WBC Count : 14.8 K/uL  Hemoglobin : 7.7 g/dL  Hematocrit : 23.7 %  Platelet Count - Automated : 282 K/uL  Mean Cell Volume : 71.8 fL  Mean Cell Hemoglobin : 23.3 pg  Mean Cell Hemoglobin Concentration : 32.5 g/dL  Auto Neutrophil # : x  Auto Lymphocyte # : x  Auto Monocyte # : x  Auto Eosinophil # : x  Auto Basophil # : x  Auto Neutrophil % : 85.9 %  Auto Lymphocyte % : 6.4 %  Auto Monocyte % : 6.3 %  Auto Eosinophil % : 1.0 %  Auto Basophil % : 0.4 %    04 Feb 2017 07:07    131    |  102    |  7      ----------------------------<  140    3.4     |  18     |  0.38     Ca    7.8        04 Feb 2017 07:07  Mg     2.0       04 Feb 2017 07:07                        RADIOLOGY & ADDITIONAL STUDIES (The following images were personally reviewed):

## 2017-02-06 DIAGNOSIS — D72.829 ELEVATED WHITE BLOOD CELL COUNT, UNSPECIFIED: ICD-10-CM

## 2017-02-06 DIAGNOSIS — J69.0 PNEUMONITIS DUE TO INHALATION OF FOOD AND VOMIT: ICD-10-CM

## 2017-02-06 LAB
ANION GAP SERPL CALC-SCNC: 12 MMOL/L — SIGNIFICANT CHANGE UP (ref 9–16)
BUN SERPL-MCNC: 16 MG/DL — SIGNIFICANT CHANGE UP (ref 7–23)
CALCIUM SERPL-MCNC: 7.6 MG/DL — LOW (ref 8.5–10.5)
CHLORIDE SERPL-SCNC: 101 MMOL/L — SIGNIFICANT CHANGE UP (ref 96–108)
CO2 SERPL-SCNC: 20 MMOL/L — LOW (ref 22–31)
CREAT SERPL-MCNC: 0.31 MG/DL — LOW (ref 0.5–1.3)
FERRITIN SERPL-MCNC: 97.7 NG/ML — SIGNIFICANT CHANGE UP (ref 8–252)
GLUCOSE SERPL-MCNC: 144 MG/DL — HIGH (ref 70–99)
HCT VFR BLD CALC: 24 % — LOW (ref 34.5–45)
HGB BLD-MCNC: 7.9 G/DL — LOW (ref 11.5–15.5)
IRON SATN MFR SERPL: 112 UG/DL — SIGNIFICANT CHANGE UP (ref 50–170)
IRON SATN MFR SERPL: 90 % — HIGH (ref 20–38)
MAGNESIUM SERPL-MCNC: 2.5 MG/DL — HIGH (ref 1.6–2.4)
MCHC RBC-ENTMCNC: 23.5 PG — LOW (ref 27–34)
MCHC RBC-ENTMCNC: 32.9 G/DL — SIGNIFICANT CHANGE UP (ref 32–36)
MCV RBC AUTO: 71.4 FL — LOW (ref 80–100)
PLATELET # BLD AUTO: 289 K/UL — SIGNIFICANT CHANGE UP (ref 150–400)
POTASSIUM SERPL-MCNC: 4 MMOL/L — SIGNIFICANT CHANGE UP (ref 3.5–5.3)
POTASSIUM SERPL-SCNC: 4 MMOL/L — SIGNIFICANT CHANGE UP (ref 3.5–5.3)
RBC # BLD: 3.28 M/UL — LOW (ref 3.8–5.2)
RBC # BLD: 3.36 M/UL — LOW (ref 3.8–5.2)
RBC # FLD: 17.6 % — HIGH (ref 10.3–16.9)
RETICS/RBC NFR: 1.3 % — SIGNIFICANT CHANGE UP (ref 0.5–2.5)
SODIUM SERPL-SCNC: 133 MMOL/L — LOW (ref 135–145)
TIBC SERPL-MCNC: 124 UG/DL — LOW (ref 250–450)
WBC # BLD: 14.8 K/UL — HIGH (ref 3.8–10.5)
WBC # FLD AUTO: 14.8 K/UL — HIGH (ref 3.8–10.5)

## 2017-02-06 PROCEDURE — 93306 TTE W/DOPPLER COMPLETE: CPT | Mod: 26

## 2017-02-06 PROCEDURE — 99233 SBSQ HOSP IP/OBS HIGH 50: CPT

## 2017-02-06 PROCEDURE — 71010: CPT | Mod: 26

## 2017-02-06 RX ORDER — VANCOMYCIN HCL 1 G
1000 VIAL (EA) INTRAVENOUS EVERY 24 HOURS
Qty: 0 | Refills: 0 | Status: COMPLETED | OUTPATIENT
Start: 2017-02-06 | End: 2017-02-07

## 2017-02-06 RX ORDER — PANTOPRAZOLE SODIUM 20 MG/1
40 TABLET, DELAYED RELEASE ORAL DAILY
Qty: 0 | Refills: 0 | Status: DISCONTINUED | OUTPATIENT
Start: 2017-02-06 | End: 2017-02-09

## 2017-02-06 RX ORDER — PANTOPRAZOLE SODIUM 20 MG/1
40 TABLET, DELAYED RELEASE ORAL
Qty: 0 | Refills: 0 | Status: DISCONTINUED | OUTPATIENT
Start: 2017-02-06 | End: 2017-02-06

## 2017-02-06 RX ORDER — IPRATROPIUM/ALBUTEROL SULFATE 18-103MCG
3 AEROSOL WITH ADAPTER (GRAM) INHALATION EVERY 4 HOURS
Qty: 0 | Refills: 0 | Status: DISCONTINUED | OUTPATIENT
Start: 2017-02-06 | End: 2017-02-09

## 2017-02-06 RX ORDER — HEPARIN SODIUM 5000 [USP'U]/ML
5000 INJECTION INTRAVENOUS; SUBCUTANEOUS EVERY 12 HOURS
Qty: 0 | Refills: 0 | Status: DISCONTINUED | OUTPATIENT
Start: 2017-02-06 | End: 2017-02-09

## 2017-02-06 RX ADMIN — PIPERACILLIN AND TAZOBACTAM 200 GRAM(S): 4; .5 INJECTION, POWDER, LYOPHILIZED, FOR SOLUTION INTRAVENOUS at 11:41

## 2017-02-06 RX ADMIN — INSULIN HUMAN 1: 100 INJECTION, SOLUTION SUBCUTANEOUS at 06:10

## 2017-02-06 RX ADMIN — PIPERACILLIN AND TAZOBACTAM 200 GRAM(S): 4; .5 INJECTION, POWDER, LYOPHILIZED, FOR SOLUTION INTRAVENOUS at 17:23

## 2017-02-06 RX ADMIN — ATORVASTATIN CALCIUM 20 MILLIGRAM(S): 80 TABLET, FILM COATED ORAL at 22:51

## 2017-02-06 RX ADMIN — NYSTATIN CREAM 1 APPLICATION(S): 100000 CREAM TOPICAL at 17:24

## 2017-02-06 RX ADMIN — HEPARIN SODIUM 5000 UNIT(S): 5000 INJECTION INTRAVENOUS; SUBCUTANEOUS at 05:36

## 2017-02-06 RX ADMIN — Medication 250 MILLIGRAM(S): at 17:23

## 2017-02-06 RX ADMIN — Medication 500 MILLIGRAM(S): at 11:40

## 2017-02-06 RX ADMIN — Medication 3 MILLILITER(S): at 09:04

## 2017-02-06 RX ADMIN — Medication 1 MILLIGRAM(S): at 11:40

## 2017-02-06 RX ADMIN — INSULIN HUMAN 2: 100 INJECTION, SOLUTION SUBCUTANEOUS at 12:24

## 2017-02-06 RX ADMIN — NYSTATIN CREAM 1 APPLICATION(S): 100000 CREAM TOPICAL at 05:37

## 2017-02-06 RX ADMIN — PIPERACILLIN AND TAZOBACTAM 200 GRAM(S): 4; .5 INJECTION, POWDER, LYOPHILIZED, FOR SOLUTION INTRAVENOUS at 05:36

## 2017-02-06 RX ADMIN — HEPARIN SODIUM 5000 UNIT(S): 5000 INJECTION INTRAVENOUS; SUBCUTANEOUS at 17:23

## 2017-02-06 RX ADMIN — Medication 3 MILLILITER(S): at 05:36

## 2017-02-06 NOTE — PROGRESS NOTE ADULT - SUBJECTIVE AND OBJECTIVE BOX
CHUCK LUCIO   MRN-[id number]    CC:     HPI:  Patient is a 89 yo Female pmh of multiple CVAs many years ago, 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 Per family, poor PO intake has been going on for many months, gradually decreasing. Pt with PGT placement on 2/3 with post of run of A Fib with 's-170's and complication of likely aspiration, family opted to begin abt to treat likely PNA and pt placed on hi flow 02.     Subjective: elderly, debilitated femal in NAD    DYSPNEA: Y 	  NAUS/VOM:  N	  SECRETIONS N	  AGITATION:  N  Pain (Y/N):  N     -Provocation/Palliation:  -Quality/Quantity:  -Radiating:  -Severity:  -Timing/Frequency:  -Impact on ADLs:    OTHER REVIEW OF SYSTEMS:  UNABLE TO OBTAIN  due to: AMS    PEx:  T(C): 36.7, Max: 37.7 (02-05 @ 15:49)  HR: 76 (69 - 96)  BP: 129/61 (129/61 - 159/71)  RR: 18 (17 - 19)  SpO2: 99% (98% - 100%)  Wt(kg): --36.6 kg    Constitutional: elderly female in bed in NAD  HEENT: No scleral icterus. No Conjunctival Pallor, MM dry.   Neck : supple no JVD  Respiratory: diffuse b/L rhonchi   Cardiovascular: Normal S1 and S2, tachycardic no MRG   Gastrointestinal: BS normoactive. S/NT/ND. + PGT in situ, dsg dry and intact  : + hussein  Extremities: WWP. Cyanotic toes.B/L upper and lower extremity contractures   Neurological:lethargic, NV cannot follow commands  Psych: calm   Skin: multiple decubiti ulcers      No Known Allergies      OPIATE NAÏVE yes  iSTOP REVIEWED no    MEDICATIONS: REVIEWED  MEDICATIONS  (STANDING):  atorvastatin 20milliGRAM(s) Oral at bedtime  folic acid 1milliGRAM(s) Oral daily  ascorbic acid 500milliGRAM(s) Oral daily  heparin  Injectable 5000Unit(s) SubCutaneous every 12 hours  nystatin Powder 1Application(s) Topical two times a day  diltiazem    Tablet 180milliGRAM(s) Oral two times a day  piperacillin/tazobactam IVPB. 3.375Gram(s) IV Intermittent every 6 hours  insulin regular  human corrective regimen sliding scale  SubCutaneous every 6 hours  vancomycin  IVPB 1000milliGRAM(s) IV Intermittent every 24 hours  vancomycin  IVPB  IV Intermittent     MEDICATIONS  (PRN):  bisacodyl 5milliGRAM(s) Oral every 12 hours PRN Constipation  ALBUTerol/ipratropium for Nebulization 3milliLiter(s) Nebulizer every 4 hours PRN Shortness of Breath and/or Wheezing      LABS: REVIEWED      IMAGING: REVIEWED    ADVANCED DIRECTIVES:     FULL CODE      DECISION MAKER: Raji Lucio  LEGAL SURROGATE:    PSYCHOSOCIAL-SPIRITUAL ASSESSMENT:       Reviewed       Care plan unchanged        GOALS OF CARE DISCUSSION                See previous Palliative Medicine Note       Documentation of GOC: family wishes to continue all life sustaining treatments    including but not limited to intubation and cardiac compressions     	      AGENCY CHOICE DISCUSSED:     [facility]    REFERRALS	        Palliative Med        Unit SW/Case Mgmt              Speech/Swallow                    CRITICAL CARE TIME PROVIDED TO UNSTABLE PT W/ ORGAN FAILURE	   Start:               End:  	       Minutes:              > 50% OF THE TIME SPENT IN COUNSELING AND COORDINATING CARE 	   Start:               End:  	       Minutes:      PROLONGED SERVICE             FACE TO FACE:    PT            PT & FAMILY	   Start:               End:  	       Minutes:      Advance Care Planning Time:

## 2017-02-06 NOTE — PROGRESS NOTE ADULT - SUBJECTIVE AND OBJECTIVE BOX
OVERNIGHT EVENTS: TITI    SUBJECTIVE: NAD    VITAL SIGNS: Last 24 Hrs  T(F): 98, Max: 99.8 (02-05 @ 15:49)  HR: 76 (69 - 96)  BP: 129/61 (129/61 - 159/71)  RR: 18 (17 - 19)  SpO2: 99% (98% - 100%)    CAPILLARY BLOOD GLUCOSE  214 (12:06)  145 (07:30)  192 (00:15)  156 (17:17)    PHYSICAL EXAM:  Constitutional: Frail, non-verbal  HEENT: Scleral icterus. No Conjunctival Pallor, MMM. Neck   Respiratory: CTABL, no wheezes, diffuse rhonchi, increased work of breathing  Cardiovascular: Irregular rhythm, S1, S2  Gastrointestinal: BS normoactive. S/NT/ND  Extremities: WWP. Stage 4 sacral decubitus ulcer, blue dry gangrenous toes, several pressure ulcer.   Neurological:  Reflexes 3 plus.    MEDICATIONS  (STANDING):  atorvastatin 20milliGRAM(s) Oral at bedtime  folic acid 1milliGRAM(s) Oral daily  ascorbic acid 500milliGRAM(s) Oral daily  heparin  Injectable 5000Unit(s) SubCutaneous every 12 hours  nystatin Powder 1Application(s) Topical two times a day  diltiazem    Tablet 180milliGRAM(s) Oral two times a day  piperacillin/tazobactam IVPB. 3.375Gram(s) IV Intermittent every 6 hours  insulin regular  human corrective regimen sliding scale  SubCutaneous every 6 hours  vancomycin  IVPB 1000milliGRAM(s) IV Intermittent every 24 hours    MEDICATIONS  (PRN):  bisacodyl 5milliGRAM(s) Oral every 12 hours PRN Constipation  ALBUTerol/ipratropium for Nebulization 3milliLiter(s) Nebulizer every 4 hours PRN Shortness of Breath and/or Wheezing    ALLERGIES: No Known Allergies    LABS: ( 06 Feb 2017 06:39 )                        7.9    14.8  )-----------( 289                   24.0     133    |  101    |  16     ----------------------------<  144    4.0     |  20     |  0.31     Ca    7.6        Mg     2.5

## 2017-02-06 NOTE — PROGRESS NOTE ADULT - PROBLEM SELECTOR PLAN 6
s/p PEG tube placement on 2/2. TF started on 2/4.  - c/w Tube feed - will reduce feeding rate in the setting of new b/l infiltrate w/ concerns for aspiration

## 2017-02-06 NOTE — PROGRESS NOTE ADULT - PROBLEM SELECTOR PLAN 4
Pt with multiple non-healing decubitus ulcers in setting of poor nutritional status. Is receiving aggressive wound care in the home provided primarily by family members, Foot continues with increasing necrosis. Refusing hospice services at this time

## 2017-02-06 NOTE — PROGRESS NOTE ADULT - PROBLEM SELECTOR PLAN 7
Spoke with Dr Lucero this am. Plan is to discharge pt home with previous services once medically stable. Family wishes to respect pt Faith beliefs to "procure life at all costs" Family in close contact with private rabbi. Will sign off at this time. Please reconsult if we can be of any further assistance

## 2017-02-06 NOTE — DISCHARGE NOTE ADULT - PLAN OF CARE
Improvement of your pneumonia You presented to us w/ poor PO intake, and developed an pneuomonia from aspiration. We treated you with antibiotics upon which your symptoms improved. We placed a PEG tube on you, and started feeding during your hospital stay. Please continue tube feeding w/ Glucerma 1.2 starting at 10mL/hr, increase by 5mL/2hr, with a goal of 53mL/hr You have a stage 3 decubitus ulcer in your sacrum that needs regular care. Please cleanse with wound cleanser. Sacral pressure injury - pack lightly with Aquacel AG, cover with foam dressing every other day and prn if soiled or wet. Fungal rash - apply nystatin powder twice daily and cover with liquid skin barrier.  Bilateral posterior knees, LLE lateral aspect, left buttocks and left trochanter - apply foam dressing every other day and prn if soiled or wet. Use z pritesh pillow for repositioning and offload heels. You were diagnosed with systolic heart failure. Your heart pumping function (Ejection fraction) was 30%. You received Lasix to remove excess fluids and your respiratory status improved. Please follow up with your primary care doctor. You presented to us w/ poor PO intake, and developed an pneumonia from aspiration. We treated you with 7 days of antibiotics upon which your symptoms improved. You have a stage 3 decubitus ulcer in your sacrum that needs regular care. Please cleanse with wound cleanser. Sacral pressure injury - pack lightly with Aquacel AG, cover with foam dressing every other day and prn if soiled or wet. Fungal rash - apply nystatin powder twice daily and cover with liquid skin barrier.  Bilateral posterior knees, LLE lateral aspect, left buttocks and left trochanter - apply foam dressing every other day and as needed if soiled or wet. Use Z pritesh pillow for repositioning and offload heels. We placed a PEG tube on you, and started feeding during your hospital stay. Please continue tube feeding w/ Glucerma 1.2 starting at 10mL/hr, increase by 5mL/2hr, with a goal of 53mL/hr. We placed a PEG tube on you, and started feeding during your hospital stay. Please continue tube feeding w/ Glucerna 1.2 starting at 10mL/hr, increase by 5mL/2hr, with a goal of 53mL/hr.

## 2017-02-06 NOTE — PROGRESS NOTE ADULT - SUBJECTIVE AND OBJECTIVE BOX
Pt seen and examined  no major changes    REVIEW OF SYSTEMS:  patient unable to give      MEDICATIONS:  MEDICATIONS  (STANDING):  atorvastatin 20milliGRAM(s) Oral at bedtime  folic acid 1milliGRAM(s) Oral daily  ascorbic acid 500milliGRAM(s) Oral daily  heparin  Injectable 5000Unit(s) SubCutaneous every 12 hours  nystatin Powder 1Application(s) Topical two times a day  diltiazem    Tablet 180milliGRAM(s) Oral two times a day  piperacillin/tazobactam IVPB. 3.375Gram(s) IV Intermittent every 6 hours  insulin regular  human corrective regimen sliding scale  SubCutaneous every 6 hours  vancomycin  IVPB 1000milliGRAM(s) IV Intermittent every 24 hours  vancomycin  IVPB  IV Intermittent     MEDICATIONS  (PRN):  bisacodyl 5milliGRAM(s) Oral every 12 hours PRN Constipation  ALBUTerol/ipratropium for Nebulization 3milliLiter(s) Nebulizer every 4 hours PRN Shortness of Breath and/or Wheezing      Allergies    No Known Allergies    Intolerances        Vital Signs Last 24 Hrs  T(C): 36.7, Max: 37.7 (02-05 @ 15:49)  T(F): 98, Max: 99.8 (02-05 @ 15:49)  HR: 76 (69 - 96)  BP: 129/61 (129/61 - 159/71)  BP(mean): --  RR: 18 (17 - 19)  SpO2: 99% (98% - 100%)    I & Os for current day (as of 02-06 @ 13:07)  =============================================  IN: 2217 ml / OUT: 600 ml / NET: 1617 ml      PHYSICAL EXAM:    General: on supplemental O2 mild tachypnea  HEENT: MMM, conjunctiva and sclera clear  Gastrointestinal: Soft non-tender non-distended; PEG site clean  feeds at 53cc/hr Normal bowel sounds; No hepatosplenomegaly  Skin: Warm and dry. No obvious rash    LABS:      CBC Full  -  ( 06 Feb 2017 06:39 )  WBC Count : 14.8 K/uL  Hemoglobin : 7.9 g/dL  Hematocrit : 24.0 %  Platelet Count - Automated : 289 K/uL  Mean Cell Volume : 71.4 fL  Mean Cell Hemoglobin : 23.5 pg  Mean Cell Hemoglobin Concentration : 32.9 g/dL  Auto Neutrophil # : x  Auto Lymphocyte # : x  Auto Monocyte # : x  Auto Eosinophil # : x  Auto Basophil # : x  Auto Neutrophil % : x  Auto Lymphocyte % : x  Auto Monocyte % : x  Auto Eosinophil % : x  Auto Basophil % : x    06 Feb 2017 06:40    133    |  101    |  16     ----------------------------<  144    4.0     |  20     |  0.31     Ca    7.6        06 Feb 2017 06:40  Mg     2.5       06 Feb 2017 06:40                        RADIOLOGY & ADDITIONAL STUDIES (The following images were personally reviewed):

## 2017-02-06 NOTE — DISCHARGE NOTE ADULT - NS MD DC FALL RISK RISK
For information on Fall & Injury Prevention, visit [email or web address]

## 2017-02-06 NOTE — PROGRESS NOTE ADULT - PROBLEM SELECTOR PLAN 1
Please check vancomycin trough with next dose    Prognosis is poor in view of patients age and neurologic status

## 2017-02-06 NOTE — PROGRESS NOTE ADULT - SUBJECTIVE AND OBJECTIVE BOX
INTERVAL HPI/OVERNIGHT EVENTS:    ANTIBIOTICS    MEDICATIONS  (STANDING):  atorvastatin 20milliGRAM(s) Oral at bedtime  folic acid 1milliGRAM(s) Oral daily  ascorbic acid 500milliGRAM(s) Oral daily  heparin  Injectable 5000Unit(s) SubCutaneous every 12 hours  nystatin Powder 1Application(s) Topical two times a day  diltiazem    Tablet 180milliGRAM(s) Oral two times a day  piperacillin/tazobactam IVPB. 3.375Gram(s) IV Intermittent every 6 hours  insulin regular  human corrective regimen sliding scale  SubCutaneous every 6 hours  vancomycin  IVPB 1000milliGRAM(s) IV Intermittent every 24 hours  vancomycin  IVPB  IV Intermittent     MEDICATIONS  (PRN):  bisacodyl 5milliGRAM(s) Oral every 12 hours PRN Constipation  ALBUTerol/ipratropium for Nebulization 3milliLiter(s) Nebulizer every 4 hours PRN Shortness of Breath and/or Wheezing      Allergies    No Known Allergies    Intolerances        REVIEW OF SYSTEMS:    Patient lethargic  unable to provide a review of systems  Vital Signs Last 24 Hrs  T(C): 36.7, Max: 37.7 (02-05 @ 15:49)  T(F): 98, Max: 99.8 (02-05 @ 15:49)  HR: 76 (69 - 96)  BP: 129/61 (129/61 - 159/71)  BP(mean): --  RR: 18 (17 - 19)  SpO2: 99% (98% - 100%)    PHYSICAL EXAM:      Eyes: PERRL, EOM intact; conjunctiva and sclera clear  Head: Normocephalic; atraumatic  ENMT: No nasal discharge; airway clear  Neck: Supple; non tender; no masses  Respiratory:slight ronchi  Cardiovascular: Regular rate and rhythm. S1 and S2 Normal; No murmurs, gallops or rubs  Gastrointestinal: Soft non-tender non-distended; Normal bowel sounds; No hepatosplenomegaly  Genitourinary: No costovertebral angle tenderness  Extremities: Normal range of motion, No clubbing, cyanosis or edema  Vascular: Peripheral pulses palpable 2+ bilaterally  Neurological:un communicative  Skin: Warm and dry. No acute rash  Lymph Nodes: No acute cervical adenopathy      LABS:                        7.9    14.8  )-----------( 289      ( 06 Feb 2017 06:39 )             24.0     06 Feb 2017 06:40    133    |  101    |  16     ----------------------------<  144    4.0     |  20     |  0.31     Ca    7.6        06 Feb 2017 06:40  Mg     2.5       06 Feb 2017 06:40              MICROBIOLOGY:      RADIOLOGY & ADDITIONAL STUDIES:

## 2017-02-06 NOTE — DISCHARGE NOTE ADULT - CARE PROVIDER_API CALL
Sebastián Lucero), Medicine  [address] [facility] [address]  [place] [address]  Phone: [phone]  Fax: [phone]

## 2017-02-06 NOTE — PROGRESS NOTE ADULT - PROBLEM SELECTOR PLAN 5
Recommendation: Pt with progressive dysphagia likely secondary to worsening mental status in setting of severe dementia and advanced illness. PGT tube placed 2/3. feeding increased over weekend. Presently requiring intermittent suctioning

## 2017-02-06 NOTE — DISCHARGE NOTE ADULT - HOSPITAL COURSE
89 yo F history of Multiple CVAs, PAFIB, admitted for poor PO intake. Patient had leukocytosis but no other signs of clear infection. Vascular commented NTD about the gangrenous toes. Palliative was consulted and patient was full code after discussion and  they wanted a PEG. Post PEG a rapid was called with patient in RVR and tachypneic with apneic episodes. Patient started on empiric antibiotics for suspected aspiration PNA, nebulizers, and given 10 mg of diltiazem for control.  Stepped up to [facility] for further monitoring of neuro status.  She was initially requiring HFNC, but was later satting >95% on 3L. Started on feeds through PEG tube.  She clinically improved and was deemed stable for SD to RMF. 89 yo F history of Multiple CVAs, PAFIB, admitted for poor PO intake. Patient had leukocytosis but no other signs of clear infection. Vascular commented NTD about the gangrenous toes. Palliative was consulted and patient was full code after discussion and  they wanted a PEG. Post PEG a rapid was called with patient in RVR and tachypneic with apneic episodes. Patient started on empiric antibiotics for suspected aspiration PNA, nebulizers, and given 10 mg of diltiazem for control.  Stepped up to [facility] for further monitoring of neuro status.  She was initially requiring HFNC, but was later satting >95% on 3L. Started on feeds through PEG tube.  She clinically improved and was deemed stable for SD to RMF on 2/3. Tube feed was started on 2/4. Pt was found w/ increased work of breathing on 2/6 and CXR was remarkable for bilateral infiltrate and pulmonary congestion. An echo was performed which showed (..). 89 yo F history of Multiple CVAs, PAFIB, admitted for poor PO intake. Patient had leukocytosis but no other signs of clear infection. Vascular commented NTD about the gangrenous toes. Palliative was consulted and patient was full code after discussion and  they wanted a PEG. Post PEG a rapid was called with patient in RVR and tachypneic with apneic episodes. Patient started on empiric antibiotics for suspected aspiration PNA, nebulizers, and given 10 mg of diltiazem for control.  Stepped up to 7 [facility] for further monitoring of neuro status.  She was initially requiring HFNC, but was later satting >95% on 3L. Started on feeds through PEG tube.  She clinically improved and was deemed stable for SD to RMF on 2/3. Tube feed was started on 2/4. Pt was found w/ increased work of breathing on 2/6 and CXR was remarkable for bilateral infiltrate and pulmonary congestion. An echo was performed which showed severe apical and inferior akinesia w/ an LVEF of 30%, w/ LA dilation. Pt was started on Lasix for the newly diagnosed heart failure. Pt's feeds were stopped due to worsening aspiration seen on CXR, and pt was placed on HFNC after which pt's respiratory status improved. Pt finished 7 days course of Abx for suspected aspiration PNA. Respiratory status improved after diuresis, weaned off  HFNC, now on NC. Tube feeds restarted, well tolerated. 89 yo F history of Multiple CVAs, PAFIB, admitted for poor PO intake. Patient had leukocytosis but no other signs of clear infection. Vascular commented NTD about the gangrenous toes. Palliative was consulted and patient was full code after discussion and  they wanted a PEG. Post PEG a rapid was called with patient in RVR and tachypneic with apneic episodes. Patient started on empiric antibiotics for suspected aspiration PNA, nebulizers, and given 10 mg of diltiazem for control.  Stepped up to [facility] for further monitoring of neuro status.  She was initially requiring HFNC, but was later satting >95% on 3L. Started on feeds through PEG tube.  She clinically improved and was deemed stable for SD to RMF on 2/3. Tube feed was started on 2/4. Pt was found w/ increased work of breathing on 2/6 and CXR was remarkable for bilateral infiltrate and pulmonary congestion. An echo was performed which showed severe apical and inferior akinesia w/ an LVEF of 30%, w/ LA dilation. Pt was started on Lasix for the newly diagnosed heart failure. Pt's feeds were stopped due to worsening aspiration seen on CXR, and pt was placed on HFNC after which pt's respiratory status improved. Pt finished 7 days course of Abx for suspected aspiration PNA. Respiratory status improved after diuresis, weaned off  HFNC, now on NC. Tube feeds restarted, well tolerated. Patient stable for discharge to home and will follow up with Dr. Lucero

## 2017-02-06 NOTE — DISCHARGE NOTE ADULT - SECONDARY DIAGNOSIS.
PEG (percutaneous endoscopic gastrostomy) adjustment/replacement/removal Decubitus ulcer of sacral region, stage 3 Gastrostomy in place Dementia Chronic systolic congestive heart failure

## 2017-02-06 NOTE — DISCHARGE NOTE ADULT - MEDICATION SUMMARY - MEDICATIONS TO TAKE
I will START or STAY ON the medications listed below when I get home from the hospital:    losartan 100 mg oral tablet  -- 1 tab(s) by mouth once a day  -- Indication: For Chronic systolic congestive heart failure    diltiaZEM 90 mg oral tablet  -- 2 tab(s) by mouth 2 times a day  -- Indication: For Atrial fibrillation    gabapentin 100 mg oral capsule  -- 1 cap(s) by mouth 2 times a day  -- Indication: For Neuropathic pain    traZODone 100 mg oral tablet  -- 1 tab(s) by mouth once a day (at bedtime)  -- Indication: For Dementia    Janumet 50 mg-500 mg oral tablet  -- 1 tab(s) by mouth 2 times a day  -- Indication: For Diabetes    loratadine 10 mg oral capsule  -- 1 cap(s) by mouth once a day  -- Indication: For GERD    atorvastatin 20 mg oral tablet  -- 1 tab(s) by mouth once a day  -- Indication: For CVA (cerebral vascular accident)    bisacodyl 5 mg oral delayed release tablet  -- 1 tab(s) by mouth once a day  -- Indication: For Constpation    folic acid 1 mg oral tablet  -- 1 tab(s) by mouth once a day  -- Indication: For Nutrition, metabolism, and development symptoms    Vitamin C 500 mg oral capsule  -- 1 cap(s) by mouth once a day  -- Indication: For Nutrition, metabolism, and development symptoms

## 2017-02-06 NOTE — DISCHARGE NOTE ADULT - CARE PLAN
Principal Discharge DX:	Aspiration pneumonia due to vomit, unspecified laterality, unspecified part of lung  Secondary Diagnosis:	PEG (percutaneous endoscopic gastrostomy) adjustment/replacement/removal  Secondary Diagnosis:	Decubitus ulcer of sacral region, stage 3  Secondary Diagnosis:	Gastrostomy in place  Secondary Diagnosis:	Dementia Principal Discharge DX:	Aspiration pneumonia due to vomit, unspecified laterality, unspecified part of lung  Goal:	Improvement of your pneumonia  Instructions for follow-up, activity and diet:	You presented to us w/ poor PO intake, and developed an pneuomonia from aspiration. We treated you with antibiotics upon which your symptoms improved.  Secondary Diagnosis:	PEG (percutaneous endoscopic gastrostomy) adjustment/replacement/removal  Instructions for follow-up, activity and diet:	We placed a PEG tube on you, and started feeding during your hospital stay. Please continue tube feeding w/ Glucerma 1.2 starting at 10mL/hr, increase by 5mL/2hr, with a goal of 53mL/hr  Secondary Diagnosis:	Decubitus ulcer of sacral region, stage 3  Instructions for follow-up, activity and diet:	You have a stage 3 decubitus ulcer in your sacrum that needs regular care. Please cleanse with wound cleanser. Sacral pressure injury - pack lightly with Aquacel AG, cover with foam dressing every other day and prn if soiled or wet. Fungal rash - apply nystatin powder twice daily and cover with liquid skin barrier.  Bilateral posterior knees, LLE lateral aspect, left buttocks and left trochanter - apply foam dressing every other day and prn if soiled or wet. Use z pritesh pillow for repositioning and offload heels. Principal Discharge DX:	Aspiration pneumonia due to vomit, unspecified laterality, unspecified part of lung  Goal:	Improvement of your pneumonia  Instructions for follow-up, activity and diet:	You presented to us w/ poor PO intake, and developed an pneuomonia from aspiration. We treated you with antibiotics upon which your symptoms improved.  Secondary Diagnosis:	PEG (percutaneous endoscopic gastrostomy) adjustment/replacement/removal  Instructions for follow-up, activity and diet:	We placed a PEG tube on you, and started feeding during your hospital stay. Please continue tube feeding w/ Glucerma 1.2 starting at 10mL/hr, increase by 5mL/2hr, with a goal of 53mL/hr  Secondary Diagnosis:	Decubitus ulcer of sacral region, stage 3  Instructions for follow-up, activity and diet:	You have a stage 3 decubitus ulcer in your sacrum that needs regular care. Please cleanse with wound cleanser. Sacral pressure injury - pack lightly with Aquacel AG, cover with foam dressing every other day and prn if soiled or wet. Fungal rash - apply nystatin powder twice daily and cover with liquid skin barrier.  Bilateral posterior knees, LLE lateral aspect, left buttocks and left trochanter - apply foam dressing every other day and prn if soiled or wet. Use z pritesh pillow for repositioning and offload heels.  Secondary Diagnosis:	Chronic systolic congestive heart failure  Instructions for follow-up, activity and diet:	You were diagnosed with systolic heart failure. Your heart pumping function (Ejection fraction) was 30%. You received Lasix to remove excess fluids and your respiratory status improved. Please follow up with your primary care doctor. Principal Discharge DX:	Aspiration pneumonia due to vomit, unspecified laterality, unspecified part of lung  Goal:	Improvement of your pneumonia  Instructions for follow-up, activity and diet:	You presented to us w/ poor PO intake, and developed an pneumonia from aspiration. We treated you with 7 days of antibiotics upon which your symptoms improved.  Secondary Diagnosis:	PEG (percutaneous endoscopic gastrostomy) adjustment/replacement/removal  Instructions for follow-up, activity and diet:	We placed a PEG tube on you, and started feeding during your hospital stay. Please continue tube feeding w/ Glucerma 1.2 starting at 10mL/hr, increase by 5mL/2hr, with a goal of 53mL/hr  Secondary Diagnosis:	Decubitus ulcer of sacral region, stage 3  Instructions for follow-up, activity and diet:	You have a stage 3 decubitus ulcer in your sacrum that needs regular care. Please cleanse with wound cleanser. Sacral pressure injury - pack lightly with Aquacel AG, cover with foam dressing every other day and prn if soiled or wet. Fungal rash - apply nystatin powder twice daily and cover with liquid skin barrier.  Bilateral posterior knees, LLE lateral aspect, left buttocks and left trochanter - apply foam dressing every other day and as needed if soiled or wet. Use Z pritesh pillow for repositioning and offload heels.  Secondary Diagnosis:	Chronic systolic congestive heart failure  Instructions for follow-up, activity and diet:	You were diagnosed with systolic heart failure. Your heart pumping function (Ejection fraction) was 30%. You received Lasix to remove excess fluids and your respiratory status improved. Please follow up with your primary care doctor. Principal Discharge DX:	Aspiration pneumonia due to vomit, unspecified laterality, unspecified part of lung  Goal:	Improvement of your pneumonia  Instructions for follow-up, activity and diet:	You presented to us w/ poor PO intake, and developed an pneumonia from aspiration. We treated you with 7 days of antibiotics upon which your symptoms improved.  Secondary Diagnosis:	PEG (percutaneous endoscopic gastrostomy) adjustment/replacement/removal  Instructions for follow-up, activity and diet:	We placed a PEG tube on you, and started feeding during your hospital stay. Please continue tube feeding w/ Glucerma 1.2 starting at 10mL/hr, increase by 5mL/2hr, with a goal of 53mL/hr.  Secondary Diagnosis:	Decubitus ulcer of sacral region, stage 3  Instructions for follow-up, activity and diet:	You have a stage 3 decubitus ulcer in your sacrum that needs regular care. Please cleanse with wound cleanser. Sacral pressure injury - pack lightly with Aquacel AG, cover with foam dressing every other day and prn if soiled or wet. Fungal rash - apply nystatin powder twice daily and cover with liquid skin barrier.  Bilateral posterior knees, LLE lateral aspect, left buttocks and left trochanter - apply foam dressing every other day and as needed if soiled or wet. Use Z pritesh pillow for repositioning and offload heels.  Secondary Diagnosis:	Chronic systolic congestive heart failure  Instructions for follow-up, activity and diet:	You were diagnosed with systolic heart failure. Your heart pumping function (Ejection fraction) was 30%. You received Lasix to remove excess fluids and your respiratory status improved. Please follow up with your primary care doctor. Principal Discharge DX:	Aspiration pneumonia due to vomit, unspecified laterality, unspecified part of lung  Goal:	Improvement of your pneumonia  Instructions for follow-up, activity and diet:	You presented to us w/ poor PO intake, and developed an pneumonia from aspiration. We treated you with 7 days of antibiotics upon which your symptoms improved.  Secondary Diagnosis:	PEG (percutaneous endoscopic gastrostomy) adjustment/replacement/removal  Instructions for follow-up, activity and diet:	We placed a PEG tube on you, and started feeding during your hospital stay. Please continue tube feeding w/ Glucerna 1.2 starting at 10mL/hr, increase by 5mL/2hr, with a goal of 53mL/hr.  Secondary Diagnosis:	Decubitus ulcer of sacral region, stage 3  Instructions for follow-up, activity and diet:	You have a stage 3 decubitus ulcer in your sacrum that needs regular care. Please cleanse with wound cleanser. Sacral pressure injury - pack lightly with Aquacel AG, cover with foam dressing every other day and prn if soiled or wet. Fungal rash - apply nystatin powder twice daily and cover with liquid skin barrier.  Bilateral posterior knees, LLE lateral aspect, left buttocks and left trochanter - apply foam dressing every other day and as needed if soiled or wet. Use Z pritesh pillow for repositioning and offload heels.  Secondary Diagnosis:	Chronic systolic congestive heart failure  Instructions for follow-up, activity and diet:	You were diagnosed with systolic heart failure. Your heart pumping function (Ejection fraction) was 30%. You received Lasix to remove excess fluids and your respiratory status improved. Please follow up with your primary care doctor.

## 2017-02-06 NOTE — PROGRESS NOTE ADULT - PROBLEM SELECTOR PLAN 2
WBC trending daily 14.9 this am. No know etiology at this time Treatment with abt per primary  medical team

## 2017-02-06 NOTE — PROGRESS NOTE ADULT - PROBLEM SELECTOR PLAN 3
Stable - Likely euvolemic hyponatremia, Urine Osm 379, however, urine Na 82 - elevated - likely in the setting of SIADH vs. adrenal insufficiency. Pt possibly w/ secondary/tertial hypothyroidism w/ low TSH, low T3, however, normal total thyroxine.  - f/u am Cortisol, free t4  - c/t trend

## 2017-02-06 NOTE — DISCHARGE NOTE ADULT - PATIENT PORTAL LINK FT
“You can access the [facility] Patient Portal, offered by [facility], by registering with the following website: [email or web address]”

## 2017-02-06 NOTE — PROGRESS NOTE ADULT - PROBLEM SELECTOR PLAN 1
Pt found apneic after PEG tube placement on 2/2, and CXR remarkable for air-bronchogram, suggestive of aspiration. Pt found w/ increased WOB today - w/ CXR remarkable for new b/l infiltrates. Increase in WBC count as well.  - c/w Vancomycin/Zosyn - day 5/7  - f/u Blood cx - NGTD  - f/u ID consult (Dr. Rodríguez); recs appreciated  - c/w frequent suctioning Pt found apneic after PEG tube placement on 2/2, and CXR remarkable for air-bronchogram, suggestive of aspiration. Pt found w/ increased WOB today - w/ CXR remarkable for new b/l infiltrates. Increase in WBC count as well.  - c/w Vancomycin/Zosyn - day 5/7  - f/u Blood cx - NGTD  - f/u ID consult (Dr. Rodríguez); recs appreciated  - c/w frequent suctioning  - f/u Echo to r/o heart failure - CXR also remarkable for pulmonary vascular congestion

## 2017-02-06 NOTE — PROGRESS NOTE ADULT - PROBLEM SELECTOR PLAN 2
Pt w/ hgb of 7.9 today, s/p 1U of PRBC transfusion yesterday for Hgb < 7. Microcytic in nature.  - f/u retic count  - f/u TIBC, serum iron, ferritin levels  - f/u B12, Folate  - c/t trend H&H daily  - maintain active T&S, transfuse if hgb < 7 Pt w/ hgb of 7.9 today, s/p 1U of PRBC transfusion yesterday for Hgb < 7. Microcytic in nature. No signs of active bleeding  - f/u retic count  - f/u TIBC, serum iron, ferritin levels  - f/u B12, Folate  - f/u Fecal occult blood  - c/t trend H&H daily  - maintain active T&S, transfuse if hgb < 7

## 2017-02-06 NOTE — PROGRESS NOTE ADULT - PROBLEM SELECTOR PLAN 5
With toes cyanosis, poor candidate for any type of intervention per vascular surgery.  - Will continue to monitor.

## 2017-02-07 DIAGNOSIS — I50.22 CHRONIC SYSTOLIC (CONGESTIVE) HEART FAILURE: ICD-10-CM

## 2017-02-07 DIAGNOSIS — A41.9 SEPSIS, UNSPECIFIED ORGANISM: ICD-10-CM

## 2017-02-07 LAB
ANION GAP SERPL CALC-SCNC: 11 MMOL/L — SIGNIFICANT CHANGE UP (ref 9–16)
APPEARANCE UR: CLEAR — SIGNIFICANT CHANGE UP
BILIRUB UR-MCNC: NEGATIVE — SIGNIFICANT CHANGE UP
BUN SERPL-MCNC: 18 MG/DL — SIGNIFICANT CHANGE UP (ref 7–23)
CALCIUM SERPL-MCNC: 7.7 MG/DL — LOW (ref 8.5–10.5)
CHLORIDE SERPL-SCNC: 102 MMOL/L — SIGNIFICANT CHANGE UP (ref 96–108)
CK MB CFR SERPL CALC: 1.5 NG/ML — SIGNIFICANT CHANGE UP (ref 0.5–3.6)
CK SERPL-CCNC: 83 U/L — SIGNIFICANT CHANGE UP (ref 26–192)
CO2 SERPL-SCNC: 20 MMOL/L — LOW (ref 22–31)
COLOR SPEC: YELLOW — SIGNIFICANT CHANGE UP
CORTIS AM PEAK SERPL-MCNC: 16.8 UG/DL — SIGNIFICANT CHANGE UP (ref 3.9–37.5)
CREAT SERPL-MCNC: 0.34 MG/DL — LOW (ref 0.5–1.3)
DIFF PNL FLD: (no result)
FOLATE SERPL-MCNC: >20 NG/ML — SIGNIFICANT CHANGE UP (ref 4.8–24.2)
GLUCOSE SERPL-MCNC: 148 MG/DL — HIGH (ref 70–99)
GLUCOSE UR QL: NEGATIVE — SIGNIFICANT CHANGE UP
HCT VFR BLD CALC: 25.7 % — LOW (ref 34.5–45)
HGB BLD-MCNC: 8.3 G/DL — LOW (ref 11.5–15.5)
KETONES UR-MCNC: NEGATIVE — SIGNIFICANT CHANGE UP
LEUKOCYTE ESTERASE UR-ACNC: (no result)
MAGNESIUM SERPL-MCNC: 2.1 MG/DL — SIGNIFICANT CHANGE UP (ref 1.6–2.4)
MCHC RBC-ENTMCNC: 23.6 PG — LOW (ref 27–34)
MCHC RBC-ENTMCNC: 32.3 G/DL — SIGNIFICANT CHANGE UP (ref 32–36)
MCV RBC AUTO: 73 FL — LOW (ref 80–100)
NITRITE UR-MCNC: NEGATIVE — SIGNIFICANT CHANGE UP
OB PNL STL: NEGATIVE — SIGNIFICANT CHANGE UP
PH UR: 6 — SIGNIFICANT CHANGE UP (ref 4–8)
PLATELET # BLD AUTO: 346 K/UL — SIGNIFICANT CHANGE UP (ref 150–400)
POTASSIUM SERPL-MCNC: 4 MMOL/L — SIGNIFICANT CHANGE UP (ref 3.5–5.3)
POTASSIUM SERPL-SCNC: 4 MMOL/L — SIGNIFICANT CHANGE UP (ref 3.5–5.3)
PROT UR-MCNC: 30 MG/DL
RBC # BLD: 3.52 M/UL — LOW (ref 3.8–5.2)
RBC # FLD: 18.1 % — HIGH (ref 10.3–16.9)
SODIUM SERPL-SCNC: 133 MMOL/L — LOW (ref 135–145)
SP GR SPEC: 1.02 — SIGNIFICANT CHANGE UP (ref 1–1.03)
T3FREE SERPL-MCNC: 1.22 PG/ML — LOW (ref 1.71–3.71)
T4 FREE SERPL-MCNC: 1.06 NG/DL — SIGNIFICANT CHANGE UP (ref 0.7–1.48)
TROPONIN I SERPL-MCNC: 0.46 NG/ML — HIGH (ref 0.01–0.04)
TSH SERPL-MCNC: 0.05 UIU/ML — LOW (ref 0.35–4.94)
UROBILINOGEN FLD QL: 0.2 E.U./DL — SIGNIFICANT CHANGE UP
VIT B12 SERPL-MCNC: 765 PG/ML — SIGNIFICANT CHANGE UP (ref 243–894)
WBC # BLD: 15 K/UL — HIGH (ref 3.8–10.5)
WBC # FLD AUTO: 15 K/UL — HIGH (ref 3.8–10.5)

## 2017-02-07 PROCEDURE — 93010 ELECTROCARDIOGRAM REPORT: CPT

## 2017-02-07 PROCEDURE — 99222 1ST HOSP IP/OBS MODERATE 55: CPT

## 2017-02-07 PROCEDURE — 71010: CPT | Mod: 26

## 2017-02-07 RX ORDER — FUROSEMIDE 40 MG
40 TABLET ORAL DAILY
Qty: 0 | Refills: 0 | Status: DISCONTINUED | OUTPATIENT
Start: 2017-02-08 | End: 2017-02-08

## 2017-02-07 RX ORDER — FUROSEMIDE 40 MG
20 TABLET ORAL ONCE
Qty: 0 | Refills: 0 | Status: COMPLETED | OUTPATIENT
Start: 2017-02-07 | End: 2017-02-07

## 2017-02-07 RX ORDER — ACETAMINOPHEN 500 MG
650 TABLET ORAL EVERY 6 HOURS
Qty: 0 | Refills: 0 | Status: DISCONTINUED | OUTPATIENT
Start: 2017-02-07 | End: 2017-02-09

## 2017-02-07 RX ADMIN — PIPERACILLIN AND TAZOBACTAM 200 GRAM(S): 4; .5 INJECTION, POWDER, LYOPHILIZED, FOR SOLUTION INTRAVENOUS at 17:00

## 2017-02-07 RX ADMIN — Medication 1 MILLIGRAM(S): at 11:25

## 2017-02-07 RX ADMIN — INSULIN HUMAN 1: 100 INJECTION, SOLUTION SUBCUTANEOUS at 07:56

## 2017-02-07 RX ADMIN — Medication 500 MILLIGRAM(S): at 11:25

## 2017-02-07 RX ADMIN — NYSTATIN CREAM 1 APPLICATION(S): 100000 CREAM TOPICAL at 07:58

## 2017-02-07 RX ADMIN — Medication 650 MILLIGRAM(S): at 05:27

## 2017-02-07 RX ADMIN — NYSTATIN CREAM 1 APPLICATION(S): 100000 CREAM TOPICAL at 17:01

## 2017-02-07 RX ADMIN — INSULIN HUMAN 1: 100 INJECTION, SOLUTION SUBCUTANEOUS at 12:53

## 2017-02-07 RX ADMIN — PIPERACILLIN AND TAZOBACTAM 200 GRAM(S): 4; .5 INJECTION, POWDER, LYOPHILIZED, FOR SOLUTION INTRAVENOUS at 06:09

## 2017-02-07 RX ADMIN — HEPARIN SODIUM 5000 UNIT(S): 5000 INJECTION INTRAVENOUS; SUBCUTANEOUS at 06:09

## 2017-02-07 RX ADMIN — PIPERACILLIN AND TAZOBACTAM 200 GRAM(S): 4; .5 INJECTION, POWDER, LYOPHILIZED, FOR SOLUTION INTRAVENOUS at 00:17

## 2017-02-07 RX ADMIN — Medication 250 MILLIGRAM(S): at 17:00

## 2017-02-07 RX ADMIN — Medication 20 MILLIGRAM(S): at 09:29

## 2017-02-07 RX ADMIN — HEPARIN SODIUM 5000 UNIT(S): 5000 INJECTION INTRAVENOUS; SUBCUTANEOUS at 17:01

## 2017-02-07 RX ADMIN — Medication 20 MILLIGRAM(S): at 11:25

## 2017-02-07 RX ADMIN — PIPERACILLIN AND TAZOBACTAM 200 GRAM(S): 4; .5 INJECTION, POWDER, LYOPHILIZED, FOR SOLUTION INTRAVENOUS at 11:25

## 2017-02-07 NOTE — PROVIDER CONTACT NOTE (CHANGE IN STATUS NOTIFICATION) - ASSESSMENT
Patient had temp elevation to 101.2, bu=499/79, HH=263. Tylenol 650 mg Via peg given, and col compress applied to skin. Blood cultures drawn. Urine UA sent to lab and chest X-ray was performed. Day Rn to f/u vital signs.

## 2017-02-07 NOTE — PROVIDER CONTACT NOTE (CHANGE IN STATUS NOTIFICATION) - ACTION/TREATMENT ORDERED:
Tylenol 650 mg Via peg given, and cool compress applied to skin. blood cultures drawn. Urine UA  ordered and chest X-ray ordered.

## 2017-02-07 NOTE — CHART NOTE - NSCHARTNOTEFT_GEN_A_CORE
OFF SERVICE NOTE:     89 yo F history of Multiple CVAs, PAFIB, admitted for poor PO intake. Patient had leukocytosis but no other signs of clear infection. Vascular commented NTD about the gangrenous toes. Palliative was consulted and patient was full code after discussion and  they wanted a PEG. Post PEG a rapid was called with patient in RVR and tachypneic with apneic episodes. Patient started on empiric antibiotics for suspected aspiration PNA, nebulizers, and given 10 mg of diltiazem for control.  Stepped up to 7 [place] for further monitoring of neuro status.  She was initially requiring HFNC, but was later satting >95% on 3L. Started on feeds through PEG tube.  She clinically improved and was deemed stable for SD to RMF on 2/3. Tube feed was started on 2/4. Pt was found w/ increased work of breathing on 2/6 and CXR was remarkable for bilateral infiltrate and pulmonary congestion. An echo was performed which showed severe apical and inferior akinesia w/ an LVEF of 30%, w/ LA dilation. Pt was started on Lasix for the newly diagnosed heart failure. Pt's feeds were stopped due to worsening aspiration seen on CXR, and pt was placed on HFNC after which pt's respiratory status improved.

## 2017-02-07 NOTE — PROGRESS NOTE ADULT - SUBJECTIVE AND OBJECTIVE BOX
Pt seen and examined reports of increased dyspnea this am but now better on high flow    REVIEW OF SYSTEMS:  patient unable to give      MEDICATIONS:  MEDICATIONS  (STANDING):  atorvastatin 20milliGRAM(s) Oral at bedtime  folic acid 1milliGRAM(s) Oral daily  ascorbic acid 500milliGRAM(s) Oral daily  nystatin Powder 1Application(s) Topical two times a day  diltiazem    Tablet 180milliGRAM(s) Oral two times a day  piperacillin/tazobactam IVPB. 3.375Gram(s) IV Intermittent every 6 hours  insulin regular  human corrective regimen sliding scale  SubCutaneous every 6 hours  heparin  Injectable 5000Unit(s) SubCutaneous every 12 hours  vancomycin  IVPB 1000milliGRAM(s) IV Intermittent every 24 hours  pantoprazole   Suspension 40milliGRAM(s) Oral daily    MEDICATIONS  (PRN):  bisacodyl 5milliGRAM(s) Oral every 12 hours PRN Constipation  ALBUTerol/ipratropium for Nebulization 3milliLiter(s) Nebulizer every 4 hours PRN Shortness of Breath and/or Wheezing  acetaminophen   Tablet 650milliGRAM(s) Oral every 6 hours PRN For Temp greater than 38 C (100.4 F)      Allergies    No Known Allergies    Intolerances        Vital Signs Last 24 Hrs  T(C): 36.8, Max: 38.4 ( @ 05:20)  T(F): 98.3, Max: 101.2 ( @ 05:20)  HR: 73 (73 - 107)  BP: 145/66 (145/66 - 173/79)  BP(mean): --  RR: 22 (20 - 22)  SpO2: 94% (94% - 98%)    I & Os for current day (as of  @ 10:13)  =============================================  IN: 1160 ml / OUT: 950 ml / NET: 210 ml      PHYSICAL EXAM:    General: Well developed; well nourished; in no acute distress  HEENT: MMM, conjunctiva and sclera clear  Lungs: apex sounds clear, decreased sounds on bases with crackles  Gastrointestinal: Soft non-tender non-distended; Normal bowel sounds; No hepatosplenomegaly  Skin: Warm and dry. No obvious rash    LABS:      CBC Full  -  ( 2017 07:18 )  WBC Count : 15.0 K/uL  Hemoglobin : 8.3 g/dL  Hematocrit : 25.7 %  Platelet Count - Automated : 346 K/uL  Mean Cell Volume : 73.0 fL  Mean Cell Hemoglobin : 23.6 pg  Mean Cell Hemoglobin Concentration : 32.3 g/dL  Auto Neutrophil # : x  Auto Lymphocyte # : x  Auto Monocyte # : x  Auto Eosinophil # : x  Auto Basophil # : x  Auto Neutrophil % : x  Auto Lymphocyte % : x  Auto Monocyte % : x  Auto Eosinophil % : x  Auto Basophil % : x    2017 07:18    133    |  102    |  18     ----------------------------<  148    4.0     |  20     |  0.34     Ca    7.7        2017 07:18  Mg     2.1       2017 07:18            Urinalysis Basic - ( 2017 07:43 )    Color: Yellow / Appearance: Clear / S.020 / pH: x  Gluc: x / Ketone: NEGATIVE  / Bili: NEGATIVE / Urobili: 0.2 E.U./dL   Blood: x / Protein: 30 mg/dL / Nitrite: NEGATIVE   Leuk Esterase: Small / RBC: 5-10 /HPF / WBC Many /HPF   Sq Epi: x / Non Sq Epi: Few /HPF / Bacteria: Present /HPF      ECHOCARDIOGRAMThe left ventricular ejection fraction is severely reduced.   The   left ventricular ejection fraction is 30%.  The left atrium is severely   dilated. Right atrial size is normal.The right ventricle is normal in   size           RADIOLOGY & ADDITIONAL STUDIES (The following images were personally reviewed): XR CHEST 1 VIEW PORT URGENTSlight improvement in the bilateral patchy interstitial and airspace   opacities which may represent pulmonary edema, pulmonary hemorrhage,   infectious process or ARDS.Slight improvement in the bilateral patchy interstitial and airspace   opacities which may represent pulmonary edema, pulmonary hemorrhage,   infectious process or ARDS.

## 2017-02-07 NOTE — PROGRESS NOTE ADULT - PROBLEM SELECTOR PLAN 2
Pt w/ hgb of 8.3 today, s/p 1U of PRBC transfusion yesterday for Hgb < 7. Microcytic in nature. No signs of active bleeding. Iron studies remarkable for AOCD, retic count wnl.  - f/u B12, Folate  - f/u Fecal occult blood  - c/t trend H&H daily  - maintain active T&S, transfuse if hgb < 7  - c/w Protonix for GI ppx

## 2017-02-07 NOTE — PROGRESS NOTE ADULT - PROBLEM SELECTOR PLAN 7
Chronically contracted and non-verbal. Pt w/ hx of multiple CVA's in the past. s/p PEG tube placement on 2/2. TF started on 2/4.  - c/w Statin for stroke prevention  - c/w Tube feed - will reduce feeding rate in the setting of new b/l infiltrate w/ concerns for aspiration

## 2017-02-07 NOTE — PROGRESS NOTE ADULT - SUBJECTIVE AND OBJECTIVE BOX
INTERVAL HPI/OVERNIGHT EVENTS:    ANTIBIOTICS    MEDICATIONS  (STANDING):  atorvastatin 20milliGRAM(s) Oral at bedtime  folic acid 1milliGRAM(s) Oral daily  ascorbic acid 500milliGRAM(s) Oral daily  nystatin Powder 1Application(s) Topical two times a day  diltiazem    Tablet 180milliGRAM(s) Oral two times a day  piperacillin/tazobactam IVPB. 3.375Gram(s) IV Intermittent every 6 hours  insulin regular  human corrective regimen sliding scale  SubCutaneous every 6 hours  heparin  Injectable 5000Unit(s) SubCutaneous every 12 hours  vancomycin  IVPB 1000milliGRAM(s) IV Intermittent every 24 hours  pantoprazole   Suspension 40milliGRAM(s) Oral daily    MEDICATIONS  (PRN):  bisacodyl 5milliGRAM(s) Oral every 12 hours PRN Constipation  ALBUTerol/ipratropium for Nebulization 3milliLiter(s) Nebulizer every 4 hours PRN Shortness of Breath and/or Wheezing  acetaminophen   Tablet 650milliGRAM(s) Oral every 6 hours PRN For Temp greater than 38 C (100.4 F)      Allergies    No Known Allergies    Intolerances        REVIEW OF SYSTEMS:    Constitutional: No fever, weight loss or fatigue  Eyes: No eye pain, visual disturbances, or discharge  ENMT:  No difficulty hearing, tinnitus, vertigo; No sinus or throat pain  Neck: No pain or stiffness  Respiratory: No cough, wheezing, chills or hemoptysis  Cardiovascular: No chest pain, palpitations, shortness of breath, dizziness or leg swelling  Gastrointestinal: No abdominal or epigastric pain. No nausea, vomiting or hematemesis; No diarrhea or constipation. No melena or hematochezia.  Genitourinary: No dysuria, frequency, hematuria or incontinence  Rectal: No pain, hemorrhoids or incontinence  Neurological: No headaches, memory loss, loss of strength, numbness or tremors  Skin: No itching, burning, rashes or lesions   Lymph Nodes: No enlarged glands  Endocrine: No heat or cold intolerance; No hair loss  Musculoskeletal: No joint pain or swelling; No muscle, back or extremity pain  Heme/Lymph: No easy bruising or bleeding gums  Allergy and Immunologic: No hives or eczema    Vital Signs Last 24 Hrs  T(C): 36.8, Max: 38.4 (02-07 @ 05:20)  T(F): 98.3, Max: 101.2 (02- @ 05:20)  HR: 73 (73 - 107)  BP: 145/66 (145/66 - 173/79)  BP(mean): --  RR: 22 (20 - 22)  SpO2: 94% (94% - 98%)    PHYSICAL EXAM:    General: Well developed; well nourished; in no acute distress  Eyes: PERRL, EOM intact; conjunctiva and sclera clear  Head: Normocephalic; atraumatic  ENMT: No nasal discharge; airway clear  Neck: Supple; non tender; no masses  Respiratory: No wheezes, rales or rhonchi  Cardiovascular: Regular rate and rhythm. S1 and S2 Normal; No murmurs, gallops or rubs  Gastrointestinal: Soft non-tender non-distended; Normal bowel sounds; No hepatosplenomegaly  Genitourinary: No costovertebral angle tenderness  Extremities: Normal range of motion, No clubbing, cyanosis or edema  Vascular: Peripheral pulses palpable 2+ bilaterally  Neurological: Alert and oriented x3  Skin: Warm and dry. No acute rash  Lymph Nodes: No acute cervical adenopathy  Musculoskeletal: Normal gait, tone, without deformities    LABS:                        8.3    15.0  )-----------( 346      ( 2017 07:18 )             25.7     2017 07:18    133    |  102    |  18     ----------------------------<  148    4.0     |  20     |  0.34     Ca    7.7        2017 07:18  Mg     2.1       2017 07:18        Urinalysis Basic - ( 2017 07:43 )    Color: Yellow / Appearance: Clear / S.020 / pH: x  Gluc: x / Ketone: NEGATIVE  / Bili: NEGATIVE / Urobili: 0.2 E.U./dL   Blood: x / Protein: 30 mg/dL / Nitrite: NEGATIVE   Leuk Esterase: Small / RBC: 5-10 /HPF / WBC Many /HPF   Sq Epi: x / Non Sq Epi: Few /HPF / Bacteria: Present /HPF          MICROBIOLOGY:      RADIOLOGY & ADDITIONAL STUDIES: INTERVAL HPI/OVERNIGHT EVENTS:    ANTIBIOTICS    MEDICATIONS  (STANDING):  atorvastatin 20milliGRAM(s) Oral at bedtime  folic acid 1milliGRAM(s) Oral daily  ascorbic acid 500milliGRAM(s) Oral daily  nystatin Powder 1Application(s) Topical two times a day  diltiazem    Tablet 180milliGRAM(s) Oral two times a day  piperacillin/tazobactam IVPB. 3.375Gram(s) IV Intermittent every 6 hours  insulin regular  human corrective regimen sliding scale  SubCutaneous every 6 hours  heparin  Injectable 5000Unit(s) SubCutaneous every 12 hours  vancomycin  IVPB 1000milliGRAM(s) IV Intermittent every 24 hours  pantoprazole   Suspension 40milliGRAM(s) Oral daily    MEDICATIONS  (PRN):  bisacodyl 5milliGRAM(s) Oral every 12 hours PRN Constipation  ALBUTerol/ipratropium for Nebulization 3milliLiter(s) Nebulizer every 4 hours PRN Shortness of Breath and/or Wheezing  acetaminophen   Tablet 650milliGRAM(s) Oral every 6 hours PRN For Temp greater than 38 C (100.4 F)      Allergies    No Known Allergies    Intolerances        REVIEW OF SYSTEMS:    Constitutional: No fever, weight loss or fatigue  Eyes: No eye pain, visual disturbances, or discharge  ENMT:  No difficulty hearing, tinnitus, vertigo; No sinus or throat pain  Neck: No pain or stiffness  Respiratory: No cough, wheezing, chills or hemoptysis  Cardiovascular: No chest pain, palpitations, shortness of breath, dizziness or leg swelling  Gastrointestinal: No abdominal or epigastric pain. No nausea, vomiting or hematemesis; No diarrhea or constipation. No melena or hematochezia.      Vital Signs Last 24 Hrs  T(C): 36.8, Max: 38.4 (- @ 05:20)  T(F): 98.3, Max: 101.2 (- @ 05:20)  HR: 73 (73 - 107)  BP: 145/66 (145/66 - 173/79)  BP(mean): --  RR: 22 (20 - 22)  SpO2: 94% (94% - 98%)    PHYSICAL EXAM:      Head: Normocephalic; atraumatic  ENMT: No nasal discharge; airway clear  Neck: Supple; non tender; no masses  Respiratory: No wheezes, rales or rhonchi  Cardiovascular: Regular rate and rhythm. S1 and S2 Normal; No murmurs, gallops or rubs  Gastrointestinal: Soft non-tender non-distended; Normal bowel sounds; No hepatosplenomegaly  Genitourinary: No costovertebral angle tenderness  formities    LABS:                        8.3    15.0  )-----------( 346      ( 2017 07:18 )             25.7     2017 07:18    133    |  102    |  18     ----------------------------<  148    4.0     |  20     |  0.34     Ca    7.7        2017 07:18  Mg     2.1       2017 07:18        Urinalysis Basic - ( 2017 07:43 )    Color: Yellow / Appearance: Clear / S.020 / pH: x  Gluc: x / Ketone: NEGATIVE  / Bili: NEGATIVE / Urobili: 0.2 E.U./dL   Blood: x / Protein: 30 mg/dL / Nitrite: NEGATIVE   Leuk Esterase: Small / RBC: 5-10 /HPF / WBC Many /HPF   Sq Epi: x / Non Sq Epi: Few /HPF / Bacteria: Present /HPF          MICROBIOLOGY:      RADIOLOGY & ADDITIONAL STUDIES: INTERVAL HPI/OVERNIGHT EVENTS:    ANTIBIOTICS    MEDICATIONS  (STANDING):  atorvastatin 20milliGRAM(s) Oral at bedtime  folic acid 1milliGRAM(s) Oral daily  ascorbic acid 500milliGRAM(s) Oral daily  nystatin Powder 1Application(s) Topical two times a day  diltiazem    Tablet 180milliGRAM(s) Oral two times a day  piperacillin/tazobactam IVPB. 3.375Gram(s) IV Intermittent every 6 hours  insulin regular  human corrective regimen sliding scale  SubCutaneous every 6 hours  heparin  Injectable 5000Unit(s) SubCutaneous every 12 hours  vancomycin  IVPB 1000milliGRAM(s) IV Intermittent every 24 hours  pantoprazole   Suspension 40milliGRAM(s) Oral daily    MEDICATIONS  (PRN):  bisacodyl 5milliGRAM(s) Oral every 12 hours PRN Constipation  ALBUTerol/ipratropium for Nebulization 3milliLiter(s) Nebulizer every 4 hours PRN Shortness of Breath and/or Wheezing  acetaminophen   Tablet 650milliGRAM(s) Oral every 6 hours PRN For Temp greater than 38 C (100.4 F)      Allergies    No Known Allergies    Intolerances        REVIEW OF SYSTEMS:    patient unable to provide review of systems  Vital Signs Last 24 Hrs  T(C): 36.8, Max: 38.4 ( @ 05:20)  T(F): 98.3, Max: 101.2 ( @ 05:20)  HR: 73 (73 - 107)  BP: 145/66 (145/66 - 173/79)  BP(mean): --  RR: 22 (20 - 22)  SpO2: 94% (94% - 98%)    PHYSICAL EXAM:      Head: Normocephalic; atraumatic  ENMT: No nasal discharge; airway clear  Neck: Supple; non tender; no masses  Respiratory: No wheezes, rales or rhonchi  Cardiovascular: Regular rate and rhythm. S1 and S2 Normal; No murmurs, gallops or rubs  Gastrointestinal: Soft non-tender non-distended; Normal bowel sounds; No hepatosplenomegaly  Genitourinary: No costovertebral angle tenderness  formities    LABS:                        8.3    15.0  )-----------( 346      ( 2017 07:18 )             25.7     2017 07:18    133    |  102    |  18     ----------------------------<  148    4.0     |  20     |  0.34     Ca    7.7        2017 07:18  Mg     2.1       2017 07:18        Urinalysis Basic - ( 2017 07:43 )    Color: Yellow / Appearance: Clear / S.020 / pH: x  Gluc: x / Ketone: NEGATIVE  / Bili: NEGATIVE / Urobili: 0.2 E.U./dL   Blood: x / Protein: 30 mg/dL / Nitrite: NEGATIVE   Leuk Esterase: Small / RBC: 5-10 /HPF / WBC Many /HPF   Sq Epi: x / Non Sq Epi: Few /HPF / Bacteria: Present /HPF          MICROBIOLOGY:      RADIOLOGY & ADDITIONAL STUDIES:

## 2017-02-07 NOTE — PROGRESS NOTE ADULT - PROBLEM SELECTOR PLAN 1
Pt febrile overnight - w/ increased leukocytosis. Multiple sources present - CXR showing new infiltrates, UA remarkable for LE and WBC. 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 Also w/ gangrenous necrotic toes, however, no signs of purulence.   - c/w Vancomycin/Zosyn - day 6/7 - f/u Vanc trough on 2/8 at 6pm  - f/u Blood cx - NGTD; blood cx repeated last night - f/u gram stain  - f/u ID consult (Dr. Rodríguez); recs appreciated  - c/w frequent suctioning for aspiration PNA  - Will consider replacing hussein  - Would care reconsulted - f/u recs  - Will consider Podiatry consult Pt febrile overnight - w/ increased leukocytosis. Multiple sources present - CXR showing new infiltrates, UA remarkable for LE and WBC. Pt w/ a stage IV decub ulcer that is foul smelling, however, no purulent. Also w/ gangrenous necrotic toes, however, no signs of purulence.   - c/w Vancomycin/Zosyn - day 6/7 - f/u Vanc trough on 2/8 at 6pm  - f/u Pulmonology consult (Dr. Boudreaux);   - f/u Blood cx - NGTD; blood cx repeated last night - f/u gram stain  - f/u ID consult (Dr. Rodríguez); recs appreciated  - c/w frequent suctioning for aspiration PNA  - Will consider replacing hussein  - Would care reconsulted - f/u recs  - Will consider Podiatry consult Pt febrile overnight - w/ increased leukocytosis. Multiple sources present - CXR showing new infiltrates, UA remarkable for LE and WBC. Pt w/ a stage IV decub ulcer that is foul smelling, however, no purulent. Also w/ gangrenous necrotic toes, however, no signs of purulence.   - c/w Vancomycin/Zosyn - day 6 - f/u Vanc trough on 2/8 at 6pm  - f/u Pulmonology consult (Dr. Boudreaux);   - f/u Blood cx - NGTD; blood cx repeated last night - f/u gram stain  - f/u ID consult (Dr. Rodríguez); recs appreciated  - c/w frequent suctioning for aspiration PNA  - Will consider replacing hussein  - Would care reconsulted - f/u recs  - Will consider Podiatry consult

## 2017-02-07 NOTE — PROGRESS NOTE ADULT - PROBLEM SELECTOR PLAN 9
F: None  E: Replete electrolytes PRN  N: c/w TF w/ glucerna 1.2 F: None  E: Replete electrolytes PRN  N: NPO

## 2017-02-07 NOTE — PROGRESS NOTE ADULT - PROBLEM SELECTOR PLAN 10
DVT PPx: c/w HSQ  GI PPx: c/w Protonix     Dispo: Pending clinical improvement    FULL CODE - DVT PPx: c/w HSQ  - GI PPx: c/w Protonix     Dispo: Pending clinical improvement    FULL CODE

## 2017-02-07 NOTE — CONSULT NOTE ADULT - SUBJECTIVE AND OBJECTIVE BOX
REASON FOR CONSULT:    HISTORY OF PRESENT ILLNESS: 89 yo Female pmh of multiple CVAs many years ago, 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 Per family, poor PO intake has been going on for many months, gradually decreasing. Per home health aid, patient had very little PO intake for past 2 days. Denied fever/chill, no nausea/vomiting, no sign of distress. About 5 years ago patient had a PEG tube, which improved her nutritional status tremendously so PEG tube was subsequently removed when patient started to tolerate PO again. Family members are hoping another PEG tube can be placed this time. Family member also reported various pressure ulcers on patient buttocks, sacral and lower extremities, which she was getting regular wound care at home by visiting nurse, however wounds are not healing as much lately. Denied worsening erythema or discharge from wounds.      PAST MEDICAL & SURGICAL HISTORY:  Hypercholesteremia  Diabetes  CVA (cerebral vascular accident)  HTN (hypertension)  No significant past surgical history      [x ] Diabetes   [x ] Hypertension  [ ] Hyperlipidemia  [ ] CAD  [ ] PCI  [ ] CABG    PREVIOUS DIAGNOSTIC TESTING:    [ ] Echocardiogram:  [ ]  Catheterization:  [ ] Stress Test:  	    MEDICATIONS:  diltiazem    Tablet 180milliGRAM(s) Oral two times a day    piperacillin/tazobactam IVPB. 3.375Gram(s) IV Intermittent every 6 hours  vancomycin  IVPB 1000milliGRAM(s) IV Intermittent every 24 hours    ALBUTerol/ipratropium for Nebulization 3milliLiter(s) Nebulizer every 4 hours PRN    acetaminophen   Tablet 650milliGRAM(s) Oral every 6 hours PRN    bisacodyl 5milliGRAM(s) Oral every 12 hours PRN  pantoprazole   Suspension 40milliGRAM(s) Oral daily    atorvastatin 20milliGRAM(s) Oral at bedtime  insulin regular  human corrective regimen sliding scale  SubCutaneous every 6 hours    folic acid 1milliGRAM(s) Oral daily  ascorbic acid 500milliGRAM(s) Oral daily  nystatin Powder 1Application(s) Topical two times a day  heparin  Injectable 5000Unit(s) SubCutaneous every 12 hours      FAMILY HISTORY:  No pertinent family history in first degree relatives      SOCIAL HISTORY:    [x ] Non-smoker  [ ] Smoker  [ ] Alcohol    Allergies    No Known Allergies    Intolerances    	    REVIEW OF SYSTEMS:    [x] as per HPI  CONSTITUTIONAL: No fever, weight loss, or fatigue  ENT:  No difficulty hearing, tinnitus, vertigo; No sinus or throat pain  RESPIRATORY: No cough, wheezing, chills or hemoptysis; No Shortness of Breath  CARDIOVASCULAR: No chest pain, palpitations, dizziness, or leg swelling  GASTROINTESTINAL: No abdominal or epigastric pain. No nausea, vomiting, or hematemesis; No diarrhea or constipation. No melena or hematochezia.  GENITOURINARY: No dysuria, frequency, hematuria, or incontinence  NEUROLOGICAL: No headaches, memory loss, loss of strength, numbness, or tremors  MUSCULOSKELETAL: No joint pain or swelling; No muscle, back, or extremity pain  [x] All others negative	  [ ] Unable to obtain    PHYSICAL EXAM:  T(C): 36.8, Max: 38.4 (02-07 @ 05:20)  HR: 73 (73 - 107)  BP: 145/66 (145/66 - 173/79)  RR: 22 (20 - 22)  SpO2: 94% (94% - 98%)  Wt(kg): --  I&O's Summary    I & Os for current day (as of 07 Feb 2017 11:27)  =============================================  IN: 1160 ml / OUT: 950 ml / NET: 210 ml      Appearance: Normal	  HEENT:   Normal oral mucosa, PERRL, EOMI	  Lymphatic: No lymphadenopathy  Cardiovascular: Normal S1 S2, No JVD, No murmurs, No edema  Respiratory: Lungs clear to auscultation	  Psychiatry: A & O x 3, Mood & affect appropriate  Gastrointestinal:  Soft, Non-tender, + BS	  Skin: No rashes, No ecchymoses, No cyanosis	  Neurologic: Non-focal  Extremities: Normal range of motion, No clubbing, cyanosis or edema  Vascular: Peripheral pulses palpable 2+ bilaterally    TELEMETRY: 	    ECG:  Diagnosis Line Sinus rhythm with short LA with premature supraventricular complexes  Nonspecific ST and T wave abnormality  ECHO: There is moderate concentric left ventricular hypertrophy.The inferior   wall and   inferior septum are akinetic and appears thinned and scarred.   The   inferolateral wall and basal lateral wall are severely hypokinetic to   akinetic.  The left ventricular ejection fraction is severely reduced.   The   left ventricular ejection fraction is 30%.  The left atrium is severely   dilated. Right atrial size is normal.The right ventricle is normal in   size and   function.Mitral annular calcification noted. There is moderate mitral   regurgitation.There was insufficient TR detected from which to calculate   pulmonary artery systolic pressure.  The IVC is dilated (>2.1 cm) with an   abnormal inspiratory collapse (<50%) consistent with elevated right   atrial   pressure.  No aortic root dilatation.Left pleural effusion noted. There   is no   pericardial effusion.  STRESS:  CATH:  	  RADIOLOGY:  CXR: Slight improvement in the bilateral patchy interstitial and airspace   opacities which may represent pulmonary edema, pulmonary hemorrhage,   infectious process or ARDS.  CT:  US:   	  	  LABS:	 	    CARDIAC MARKERS:  Troponin I, Serum: 0.456 ng/mL (02-07 @ 07:18)                                  8.3    15.0  )-----------( 346      ( 07 Feb 2017 07:18 )             25.7     07 Feb 2017 07:18    133    |  102    |  18     ----------------------------<  148    4.0     |  20     |  0.34     Ca    7.7        07 Feb 2017 07:18  Mg     2.1       07 Feb 2017 07:18      proBNP:   Lipid Profile:   HgA1c:   TSH: Thyroid Stimulating Hormone, Serum: 0.047 uIU/mL (02-07 @ 07:18)      ASSESSMENT/PLAN: 	  1. Decompensated systolic HF - EF 30% with regional akinesis, likely old ischemic event, given volume resiscitation for sepsis and IV abx, went into decompensation, recommend IV lasix goal keep net even to net negative as tolerated.  2. CAD - no further work up given age, dementia and multiple comorbiditeis, med mgmt  3. replete electrolytes, check lipid profile, vit D level, and TSH

## 2017-02-07 NOTE — PROGRESS NOTE ADULT - SUBJECTIVE AND OBJECTIVE BOX
OVERNIGHT EVENTS: Febrile overnight. CXR stable since yesterday morning, remarkable for new infiltrate, and pulmonary venous congestion. UA positive for LE, and WBC.     SUBJECTIVE: NAD    VITAL SIGNS: Last 24 Hrs  T(F): 101.2, Max: 101.2 (-07 @ 05:20)  HR: 107 (76 - 107)  BP: 173/79 (129/61 - 173/79)  RR: 20 (18 - 20)  SpO2: 98% (98% - 99%)    CAPILLARY BLOOD GLUCOSE  156 (07:21)  143 (00:16)  170 (22:02)  131 (17:52)  214 (12:06)    PHYSICAL EXAM:  Constitutional: Frail, non-verbal  HEENT: Scleral icterus. No Conjunctival Pallor, dry MM  Respiratory: CTABL, no wheezes, diffuse rhonchi, increased work of breathing  Cardiovascular: Irregular rhythm, S1, S2  Gastrointestinal: BS normoactive. S/NT/ND  Extremities: WWP, UE/LE severely contracted, stage 4 sacral decubitus ulcer, blue dry gangrenous toes, several pressure ulcer, right hand non-pitting edema    MEDICATIONS  (STANDING):  atorvastatin 20milliGRAM(s) Oral at bedtime  folic acid 1milliGRAM(s) Oral daily  ascorbic acid 500milliGRAM(s) Oral daily  nystatin Powder 1Application(s) Topical two times a day  diltiazem    Tablet 180milliGRAM(s) Oral two times a day  piperacillin/tazobactam IVPB. 3.375Gram(s) IV Intermittent every 6 hours  insulin regular  human corrective regimen sliding scale  SubCutaneous every 6 hours  heparin  Injectable 5000Unit(s) SubCutaneous every 12 hours  vancomycin  IVPB 1000milliGRAM(s) IV Intermittent every 24 hours  pantoprazole   Suspension 40milliGRAM(s) Oral daily    MEDICATIONS  (PRN):  bisacodyl 5milliGRAM(s) Oral every 12 hours PRN Constipation  ALBUTerol/ipratropium for Nebulization 3milliLiter(s) Nebulizer every 4 hours PRN Shortness of Breath and/or Wheezing  acetaminophen   Tablet 650milliGRAM(s) Oral every 6 hours PRN For Temp greater than 38 C (100.4 F)    ALLERGIES: No Known Allergies    LABS:  ( 2017 07:18 )                        8.3    15.0  )-----------( 346                  25.7     133    |  102    |  18     ----------------------------<  148    4.0     |  20     |  0.34     Ca    7.7       Mg     2.1    Urinalysis Basic - ( 2017 07:43 )    Color: Yellow / Appearance: Clear / S.020 / pH: x  Gluc: x / Ketone: NEGATIVE  / Bili: NEGATIVE / Urobili: 0.2 E.U./dL   Blood: x / Protein: 30 mg/dL / Nitrite: NEGATIVE   Leuk Esterase: Small / RBC: 5-10 /HPF / WBC Many /HPF   Sq Epi: x / Non Sq Epi: Few /HPF / Bacteria: Present /HPF

## 2017-02-07 NOTE — PROGRESS NOTE ADULT - PROBLEM SELECTOR PLAN 3
Echo showing severe akinesia of the apical and inferior LV wall w/ an EF of 30%, w/ LA dilatation. CXR remarkable for pulmonary congestion. No JVD, no edema.  - f/u EKG, cardiac enzymes Echo showing severe akinesia of the apical and inferior LV wall w/ an EF of 30%, w/ LA dilatation. CXR remarkable for pulmonary congestion. No JVD, no edema.  - f/u EKG, cardiac enzymes  - f/u Cardiology consult (Dr. French); recs appreciated Echo showing severe akinesia of the apical and inferior LV wall w/ an EF of 30%, w/ LA dilatation. CXR remarkable for pulmonary congestion. No JVD, no edema.  - started Lasix today  - f/u EKG, cardiac enzymes  - f/u Cardiology consult (Dr. French); recs appreciated

## 2017-02-07 NOTE — PROVIDER CONTACT NOTE (CHANGE IN STATUS NOTIFICATION) - SITUATION
pt received from endoscopy after PEG placement, pt coughing, suctioned, /101. pulse 141 RR30 pulse95
Patient febrile this am.

## 2017-02-08 DIAGNOSIS — A41.9 SEPSIS, UNSPECIFIED ORGANISM: ICD-10-CM

## 2017-02-08 LAB
ANION GAP SERPL CALC-SCNC: 12 MMOL/L — SIGNIFICANT CHANGE UP (ref 9–16)
BASOPHILS NFR BLD AUTO: 0.7 % — SIGNIFICANT CHANGE UP (ref 0–2)
BUN SERPL-MCNC: 16 MG/DL — SIGNIFICANT CHANGE UP (ref 7–23)
CALCIUM SERPL-MCNC: 7.7 MG/DL — LOW (ref 8.5–10.5)
CHLORIDE SERPL-SCNC: 102 MMOL/L — SIGNIFICANT CHANGE UP (ref 96–108)
CHOLEST SERPL-MCNC: 101 MG/DL — SIGNIFICANT CHANGE UP
CO2 SERPL-SCNC: 23 MMOL/L — SIGNIFICANT CHANGE UP (ref 22–31)
CREAT SERPL-MCNC: 0.32 MG/DL — LOW (ref 0.5–1.3)
EOSINOPHIL NFR BLD AUTO: 2 % — SIGNIFICANT CHANGE UP (ref 0–6)
GLUCOSE SERPL-MCNC: 96 MG/DL — SIGNIFICANT CHANGE UP (ref 70–99)
HCT VFR BLD CALC: 23.9 % — LOW (ref 34.5–45)
HDLC SERPL-MCNC: 34 MG/DL — LOW
HGB BLD-MCNC: 7.8 G/DL — LOW (ref 11.5–15.5)
LIPID PNL WITH DIRECT LDL SERPL: 49 MG/DL — SIGNIFICANT CHANGE UP
LYMPHOCYTES # BLD AUTO: 12.4 % — LOW (ref 13–44)
MAGNESIUM SERPL-MCNC: 1.8 MG/DL — SIGNIFICANT CHANGE UP (ref 1.6–2.4)
MCHC RBC-ENTMCNC: 23.6 PG — LOW (ref 27–34)
MCHC RBC-ENTMCNC: 32.6 G/DL — SIGNIFICANT CHANGE UP (ref 32–36)
MCV RBC AUTO: 72.4 FL — LOW (ref 80–100)
MONOCYTES NFR BLD AUTO: 8.7 % — SIGNIFICANT CHANGE UP (ref 2–14)
NEUTROPHILS NFR BLD AUTO: 76.2 % — SIGNIFICANT CHANGE UP (ref 43–77)
PLATELET # BLD AUTO: 390 K/UL — SIGNIFICANT CHANGE UP (ref 150–400)
POTASSIUM SERPL-MCNC: 3.2 MMOL/L — LOW (ref 3.5–5.3)
POTASSIUM SERPL-SCNC: 3.2 MMOL/L — LOW (ref 3.5–5.3)
RBC # BLD: 3.3 M/UL — LOW (ref 3.8–5.2)
RBC # FLD: 18.4 % — HIGH (ref 10.3–16.9)
SODIUM SERPL-SCNC: 137 MMOL/L — SIGNIFICANT CHANGE UP (ref 135–145)
TOTAL CHOLESTEROL/HDL RATIO MEASUREMENT: 3 RATIO — SIGNIFICANT CHANGE UP
TRIGL SERPL-MCNC: 89 MG/DL — SIGNIFICANT CHANGE UP
WBC # BLD: 14.6 K/UL — HIGH (ref 3.8–10.5)
WBC # FLD AUTO: 14.6 K/UL — HIGH (ref 3.8–10.5)

## 2017-02-08 PROCEDURE — 99232 SBSQ HOSP IP/OBS MODERATE 35: CPT

## 2017-02-08 PROCEDURE — 71010: CPT | Mod: 26

## 2017-02-08 RX ORDER — FUROSEMIDE 40 MG
40 TABLET ORAL DAILY
Qty: 0 | Refills: 0 | Status: DISCONTINUED | OUTPATIENT
Start: 2017-02-09 | End: 2017-02-09

## 2017-02-08 RX ORDER — POTASSIUM CHLORIDE 20 MEQ
40 PACKET (EA) ORAL EVERY 4 HOURS
Qty: 0 | Refills: 0 | Status: COMPLETED | OUTPATIENT
Start: 2017-02-08 | End: 2017-02-08

## 2017-02-08 RX ORDER — MAGNESIUM SULFATE 500 MG/ML
1 VIAL (ML) INJECTION ONCE
Qty: 0 | Refills: 0 | Status: COMPLETED | OUTPATIENT
Start: 2017-02-08 | End: 2017-02-08

## 2017-02-08 RX ADMIN — ATORVASTATIN CALCIUM 20 MILLIGRAM(S): 80 TABLET, FILM COATED ORAL at 21:15

## 2017-02-08 RX ADMIN — Medication 40 MILLIEQUIVALENT(S): at 09:25

## 2017-02-08 RX ADMIN — Medication 1 MILLIGRAM(S): at 11:35

## 2017-02-08 RX ADMIN — Medication 100 GRAM(S): at 09:25

## 2017-02-08 RX ADMIN — INSULIN HUMAN 1: 100 INJECTION, SOLUTION SUBCUTANEOUS at 18:18

## 2017-02-08 RX ADMIN — PANTOPRAZOLE SODIUM 40 MILLIGRAM(S): 20 TABLET, DELAYED RELEASE ORAL at 11:36

## 2017-02-08 RX ADMIN — Medication 40 MILLIGRAM(S): at 06:09

## 2017-02-08 RX ADMIN — Medication 40 MILLIEQUIVALENT(S): at 13:44

## 2017-02-08 RX ADMIN — HEPARIN SODIUM 5000 UNIT(S): 5000 INJECTION INTRAVENOUS; SUBCUTANEOUS at 06:09

## 2017-02-08 RX ADMIN — Medication 500 MILLIGRAM(S): at 11:36

## 2017-02-08 RX ADMIN — PIPERACILLIN AND TAZOBACTAM 200 GRAM(S): 4; .5 INJECTION, POWDER, LYOPHILIZED, FOR SOLUTION INTRAVENOUS at 08:09

## 2017-02-08 RX ADMIN — INSULIN HUMAN 1: 100 INJECTION, SOLUTION SUBCUTANEOUS at 23:53

## 2017-02-08 RX ADMIN — HEPARIN SODIUM 5000 UNIT(S): 5000 INJECTION INTRAVENOUS; SUBCUTANEOUS at 17:11

## 2017-02-08 RX ADMIN — NYSTATIN CREAM 1 APPLICATION(S): 100000 CREAM TOPICAL at 06:10

## 2017-02-08 RX ADMIN — NYSTATIN CREAM 1 APPLICATION(S): 100000 CREAM TOPICAL at 17:16

## 2017-02-08 RX ADMIN — PIPERACILLIN AND TAZOBACTAM 200 GRAM(S): 4; .5 INJECTION, POWDER, LYOPHILIZED, FOR SOLUTION INTRAVENOUS at 02:57

## 2017-02-08 NOTE — ADVANCED PRACTICE NURSE CONSULT - ASSESSMENT
Stage 4 sacral wound still with moderate amount of drainage. Fungal rash noted at periwound, no change in wounds to BLE.

## 2017-02-08 NOTE — ADVANCED PRACTICE NURSE CONSULT - RECOMMEDATIONS
Recommend continuing previous orders of packing site with Aquacel, protect periwound with Nystatin powder and seal in with Cavilon barrier wipes. Spoke with RAYSHAWN Sadler.

## 2017-02-08 NOTE — PROGRESS NOTE ADULT - PROBLEM SELECTOR PLAN 5
Rate controlled Not rhythm controlled. Not on AC.   - c/w home Diltiazem 180 mg BID via PEG Resolved, likely hypervolemic hyponatremia 2/2 CHF, s/p Lasix

## 2017-02-08 NOTE — PROGRESS NOTE ADULT - PROBLEM SELECTOR PLAN 2
Pt w/ hgb of 8.3 today, s/p 1U of PRBC transfusion yesterday for Hgb < 7. Microcytic in nature. No signs of active bleeding. Iron studies remarkable for AOCD, retic count wnl.  - f/u B12, Folate  - f/u Fecal occult blood  - c/t trend H&H daily  - maintain active T&S, transfuse if hgb < 7  - c/w Protonix for GI ppx Pt w/ increased work of breathing on 2/6 and CXR was remarkable for bilateral infiltrate and pulmonary congestion. Respiratory distress likely 2/2 pulmonary congestion. Pt s/p Lasix 40mg IV yesterday, comfortable on HFNC this AM. Repeat CXR shows improved pulmonary congestion.  - c/w Lasix 40mg daily, goal net negative  - wean off HFNC today

## 2017-02-08 NOTE — PROGRESS NOTE ADULT - PROBLEM SELECTOR PLAN 3
Echo showing severe akinesia of the apical and inferior LV wall w/ an EF of 30%, w/ LA dilatation. CXR remarkable for pulmonary congestion. No JVD, no edema.  - started Lasix today  - f/u EKG, cardiac enzymes  - f/u Cardiology consult (Dr. French); recs appreciated Echo showing severe akinesia of the apical and inferior LV wall w/ an EF of 30%, w/ LA dilatation. CXR remarkable for pulmonary congestion. No JVD, no edema. Patient received 40mg IV Lasix yesterday, -1.4L. CXR this AM shows improved pulmonary congestion  - c/w Lasix 40mg daily

## 2017-02-08 NOTE — PROGRESS NOTE ADULT - PROBLEM SELECTOR PLAN 4
Stable - Likely euvolemic hyponatremia, Urine Osm 379, however, urine Na 82 - elevated - likely in the setting of SIADH vs. adrenal insufficiency. Pt possibly w/ secondary/tertial hypothyroidism w/ low TSH, low T3, however, normal total thyroxine.  - f/u am Cortisol, free t4  - c/t trend Rate controlled Not rhythm controlled. Not on AC.   - c/w home Diltiazem 180 mg BID via PEG

## 2017-02-08 NOTE — PROGRESS NOTE ADULT - PROBLEM SELECTOR PLAN 6
With toes cyanosis, poor candidate for any type of intervention per vascular surgery.  - Will continue to monitor. Pt w/ hgb of 7.8 today, s/p 1U of PRBC transfusion yesterday for Hgb < 7. Microcytic in nature. No signs of active bleeding. Iron studies remarkable for AOCD, retic count wnl.  - f/u B12, Folate  - f/u Fecal occult blood  - c/t trend H&H daily  - maintain active T&S, transfuse if hgb < 7  - c/w Protonix for GI ppx

## 2017-02-08 NOTE — PROGRESS NOTE ADULT - SUBJECTIVE AND OBJECTIVE BOX
INTERVAL HPI/OVERNIGHT EVENTS: no events overnight    ROS: patient non-verbal, unable to obtain ROS    VITAL SIGNS:  T(F): 99.5  HR: 83  BP: 149/52  RR: 20  SpO2: 97%  Wt(kg): --    PHYSICAL EXAM:    Constitutional: Frail, non-verbal  HEENT: Scleral icterus. No Conjunctival Pallor, dry MM  Respiratory: CTABL, no wheezes, diffuse rhonchi, increased work of breathing  Cardiovascular: Irregular rhythm, S1, S2  Gastrointestinal: BS normoactive. S/NT/ND  Extremities: WWP, UE/LE severely contracted, stage 4 sacral decubitus ulcer, blue dry gangrenous toes, several pressure ulcer, right hand non-pitting edema      MEDICATIONS  (STANDING):  atorvastatin 20milliGRAM(s) Oral at bedtime  folic acid 1milliGRAM(s) Oral daily  ascorbic acid 500milliGRAM(s) Oral daily  nystatin Powder 1Application(s) Topical two times a day  diltiazem    Tablet 180milliGRAM(s) Oral two times a day  piperacillin/tazobactam IVPB. 3.375Gram(s) IV Intermittent every 6 hours  insulin regular  human corrective regimen sliding scale  SubCutaneous every 6 hours  heparin  Injectable 5000Unit(s) SubCutaneous every 12 hours  pantoprazole   Suspension 40milliGRAM(s) Oral daily  furosemide   Injectable 40milliGRAM(s) IV Push daily  magnesium sulfate  IVPB 1Gram(s) IV Intermittent once  potassium chloride   Powder 40milliEquivalent(s) Oral every 4 hours    MEDICATIONS  (PRN):  bisacodyl 5milliGRAM(s) Oral every 12 hours PRN Constipation  ALBUTerol/ipratropium for Nebulization 3milliLiter(s) Nebulizer every 4 hours PRN Shortness of Breath and/or Wheezing  acetaminophen   Tablet 650milliGRAM(s) Oral every 6 hours PRN For Temp greater than 38 C (100.4 F)      Allergies    No Known Allergies    Intolerances        LABS:                        7.8    14.6  )-----------( 390      ( 2017 07:05 )             23.9     2017 07:05    137    |  102    |  16     ----------------------------<  96     3.2     |  23     |  0.32     Ca    7.7        2017 07:05  Mg     1.8       2017 07:05        Urinalysis Basic - ( 2017 07:43 )    Color: Yellow / Appearance: Clear / S.020 / pH: x  Gluc: x / Ketone: NEGATIVE  / Bili: NEGATIVE / Urobili: 0.2 E.U./dL   Blood: x / Protein: 30 mg/dL / Nitrite: NEGATIVE   Leuk Esterase: Small / RBC: 5-10 /HPF / WBC Many /HPF   Sq Epi: x / Non Sq Epi: Few /HPF / Bacteria: Present /HPF        RADIOLOGY & ADDITIONAL TESTS: INTERVAL HPI/OVERNIGHT EVENTS: no events overnight    ROS: patient non-verbal, unable to obtain ROS    VITAL SIGNS:  T(F): 99.5  HR: 83  BP: 149/52  RR: 20  SpO2: 97%  Wt(kg): --    PHYSICAL EXAM:    Constitutional: Frail, non-verbal, cachectic, severely contracted  HEENT: PERRL, no conjunctival pallor, dry MM  Respiratory: CTA B/L, no wheezes, rales, or rhonchi  Cardiovascular: RRR, S1, S2, no M/R/G  Gastrointestinal: BS normoactive, +PEG, non-distended  Extremities: WWP, UE/LE severely contracted, stage 4 sacral decubitus ulcer, blue dry gangrenous toes, several pressure ulcer  Vascular: radial and DP pulses 2+ b/l      MEDICATIONS  (STANDING):  atorvastatin 20milliGRAM(s) Oral at bedtime  folic acid 1milliGRAM(s) Oral daily  ascorbic acid 500milliGRAM(s) Oral daily  nystatin Powder 1Application(s) Topical two times a day  diltiazem    Tablet 180milliGRAM(s) Oral two times a day  piperacillin/tazobactam IVPB. 3.375Gram(s) IV Intermittent every 6 hours  insulin regular  human corrective regimen sliding scale  SubCutaneous every 6 hours  heparin  Injectable 5000Unit(s) SubCutaneous every 12 hours  pantoprazole   Suspension 40milliGRAM(s) Oral daily  furosemide   Injectable 40milliGRAM(s) IV Push daily  magnesium sulfate  IVPB 1Gram(s) IV Intermittent once  potassium chloride   Powder 40milliEquivalent(s) Oral every 4 hours    MEDICATIONS  (PRN):  bisacodyl 5milliGRAM(s) Oral every 12 hours PRN Constipation  ALBUTerol/ipratropium for Nebulization 3milliLiter(s) Nebulizer every 4 hours PRN Shortness of Breath and/or Wheezing  acetaminophen   Tablet 650milliGRAM(s) Oral every 6 hours PRN For Temp greater than 38 C (100.4 F)      Allergies    No Known Allergies    Intolerances        LABS:                        7.8    14.6  )-----------( 390      ( 2017 07:05 )             23.9     2017 07:05    137    |  102    |  16     ----------------------------<  96     3.2     |  23     |  0.32     Ca    7.7        2017 07:05  Mg     1.8       2017 07:05        Urinalysis Basic - ( 2017 07:43 )    Color: Yellow / Appearance: Clear / S.020 / pH: x  Gluc: x / Ketone: NEGATIVE  / Bili: NEGATIVE / Urobili: 0.2 E.U./dL   Blood: x / Protein: 30 mg/dL / Nitrite: NEGATIVE   Leuk Esterase: Small / RBC: 5-10 /HPF / WBC Many /HPF   Sq Epi: x / Non Sq Epi: Few /HPF / Bacteria: Present /HPF        RADIOLOGY & ADDITIONAL TESTS:

## 2017-02-08 NOTE — PROGRESS NOTE ADULT - PROBLEM SELECTOR PLAN 9
F: None  E: Replete electrolytes PRN  N: NPO NPO with tube feeds. Restart feeds at slow rate, increase as tolerated  Replete electrolytes PRN

## 2017-02-08 NOTE — PROGRESS NOTE ADULT - SUBJECTIVE AND OBJECTIVE BOX
Pt seen and examined seems better seems confortable    REVIEW OF SYSTEMS:  patient unable to give      MEDICATIONS:  MEDICATIONS  (STANDING):  atorvastatin 20milliGRAM(s) Oral at bedtime  folic acid 1milliGRAM(s) Oral daily  ascorbic acid 500milliGRAM(s) Oral daily  nystatin Powder 1Application(s) Topical two times a day  diltiazem    Tablet 180milliGRAM(s) Oral two times a day  piperacillin/tazobactam IVPB. 3.375Gram(s) IV Intermittent every 6 hours  insulin regular  human corrective regimen sliding scale  SubCutaneous every 6 hours  heparin  Injectable 5000Unit(s) SubCutaneous every 12 hours  pantoprazole   Suspension 40milliGRAM(s) Oral daily  potassium chloride   Powder 40milliEquivalent(s) Oral every 4 hours    MEDICATIONS  (PRN):  bisacodyl 5milliGRAM(s) Oral every 12 hours PRN Constipation  ALBUTerol/ipratropium for Nebulization 3milliLiter(s) Nebulizer every 4 hours PRN Shortness of Breath and/or Wheezing  acetaminophen   Tablet 650milliGRAM(s) Oral every 6 hours PRN For Temp greater than 38 C (100.4 F)      Allergies    No Known Allergies    Intolerances        Vital Signs Last 24 Hrs  T(C): 37.3, Max: 37.5 ( @ 05:22)  T(F): 99.2, Max: 99.5 ( @ 05:22)  HR: 66 (66 - 83)  BP: 150/47 (133/64 - 150/47)  BP(mean): --  RR: 19 (15 - 22)  SpO2: 97% (93% - 99%)  I & Os for 24h ending  @ 07:00  =============================================  IN: 100 ml / OUT: 1500 ml / NET: -1400 ml    I & Os for current day (as of  @ 09:53)  =============================================  IN: 100 ml / OUT: 0 ml / NET: 100 ml      PHYSICAL EXAM:    General: on high flow O2 in no acute distress  HEENT: MMM, conjunctiva and sclera clear  Gastrointestinal: Soft non-tender non-distended;PEG in place Normal bowel sounds; No hepatosplenomegaly  Skin: no change in gangrene    LABS:      CBC Full  -  ( 2017 07:05 )  WBC Count : 14.6 K/uL  Hemoglobin : 7.8 g/dL  Hematocrit : 23.9 %  Platelet Count - Automated : 390 K/uL  Mean Cell Volume : 72.4 fL  Mean Cell Hemoglobin : 23.6 pg  Mean Cell Hemoglobin Concentration : 32.6 g/dL  Auto Neutrophil # : x  Auto Lymphocyte # : x  Auto Monocyte # : x  Auto Eosinophil # : x  Auto Basophil # : x  Auto Neutrophil % : 76.2 %  Auto Lymphocyte % : 12.4 %  Auto Monocyte % : 8.7 %  Auto Eosinophil % : 2.0 %  Auto Basophil % : 0.7 %    2017 07:05    137    |  102    |  16     ----------------------------<  96     3.2     |  23     |  0.32     Ca    7.7        2017 07:05  Mg     1.8       2017 07:05            Urinalysis Basic - ( 2017 07:43 )    Color: Yellow / Appearance: Clear / S.020 / pH: x  Gluc: x / Ketone: NEGATIVE  / Bili: NEGATIVE / Urobili: 0.2 E.U./dL   Blood: x / Protein: 30 mg/dL / Nitrite: NEGATIVE   Leuk Esterase: Small / RBC: 5-10 /HPF / WBC Many /HPF   Sq Epi: x / Non Sq Epi: Few /HPF / Bacteria: Present /HPF                RADIOLOGY & ADDITIONAL STUDIES (The following images were personally reviewed):

## 2017-02-08 NOTE — PROGRESS NOTE ADULT - PROBLEM SELECTOR PLAN 10
- DVT PPx: c/w HSQ  - GI PPx: c/w Protonix     Dispo: Pending clinical improvement    FULL CODE - DVT PPx: c/w HSQ  - Remove Falcon today, TOV  - GI PPx: c/w Protonix     Dispo: Pending clinical improvement    FULL CODE

## 2017-02-08 NOTE — PROGRESS NOTE ADULT - SUBJECTIVE AND OBJECTIVE BOX
ANTIBIOTICS    MEDICATIONS  (STANDING):  atorvastatin 20milliGRAM(s) Oral at bedtime  folic acid 1milliGRAM(s) Oral daily  ascorbic acid 500milliGRAM(s) Oral daily  nystatin Powder 1Application(s) Topical two times a day  diltiazem    Tablet 180milliGRAM(s) Oral two times a day  insulin regular  human corrective regimen sliding scale  SubCutaneous every 6 hours  heparin  Injectable 5000Unit(s) SubCutaneous every 12 hours  pantoprazole   Suspension 40milliGRAM(s) Oral daily  potassium chloride   Powder 40milliEquivalent(s) Oral every 4 hours    MEDICATIONS  (PRN):  bisacodyl 5milliGRAM(s) Oral every 12 hours PRN Constipation  ALBUTerol/ipratropium for Nebulization 3milliLiter(s) Nebulizer every 4 hours PRN Shortness of Breath and/or Wheezing  acetaminophen   Tablet 650milliGRAM(s) Oral every 6 hours PRN For Temp greater than 38 C (100.4 F)      Allergies    No Known Allergies    Intolerances        REVIEW OF SYSTEMS:    patient unable to provide review of systems    Vital Signs Last 24 Hrs  T(C): 37.3, Max: 37.5 ( @ 05:22)  T(F): 99.2, Max: 99.5 ( @ 05:22)  HR: 66 (66 - 83)  BP: 150/47 (133/64 - 150/47)  BP(mean): --  RR: 19 (15 - 22)  SpO2: 97% (93% - 99%)    PHYSICAL EXAM:    Eyes: PERRL, EOM intact; conjunctiva and sclera clear  Head: Normocephalic; atraumatic  ENMT: No nasal discharge; airway clear  Neck: Supple; non tender; no masses  Respiratory: No wheezes, rales or rhonchi  Cardiovascular: Regular rate and rhythm. S1 and S2 Normal; No murmurs, gallops or rubs  Gastrointestinal: Soft non-tender non-distended; Normal bowel sounds; No hepatosplenomegaly        LABS:                        7.8    14.6  )-----------( 390      ( 2017 07:05 )             23.9     2017 07:05    137    |  102    |  16     ----------------------------<  96     3.2     |  23     |  0.32     Ca    7.7        2017 07:05  Mg     1.8       2017 07:05        Urinalysis Basic - ( 2017 07:43 )    Color: Yellow / Appearance: Clear / S.020 / pH: x  Gluc: x / Ketone: NEGATIVE  / Bili: NEGATIVE / Urobili: 0.2 E.U./dL   Blood: x / Protein: 30 mg/dL / Nitrite: NEGATIVE   Leuk Esterase: Small / RBC: 5-10 /HPF / WBC Many /HPF   Sq Epi: x / Non Sq Epi: Few /HPF / Bacteria: Present /HPF          MICROBIOLOGY:  Culture Results:   No growth at 1 day. ( @ 05:55)  Culture Results:   No growth at 1 day. ( @ 05:55)      RADIOLOGY & ADDITIONAL STUDIES:

## 2017-02-08 NOTE — PROGRESS NOTE ADULT - SUBJECTIVE AND OBJECTIVE BOX
INTERVAL HPI: overnight events noted, currently comfortable non-verbal  	  MEDICATIONS:  diltiazem    Tablet 180milliGRAM(s) Oral two times a day      ALBUTerol/ipratropium for Nebulization 3milliLiter(s) Nebulizer every 4 hours PRN    acetaminophen   Tablet 650milliGRAM(s) Oral every 6 hours PRN    bisacodyl 5milliGRAM(s) Oral every 12 hours PRN  pantoprazole   Suspension 40milliGRAM(s) Oral daily    atorvastatin 20milliGRAM(s) Oral at bedtime  insulin regular  human corrective regimen sliding scale  SubCutaneous every 6 hours    folic acid 1milliGRAM(s) Oral daily  ascorbic acid 500milliGRAM(s) Oral daily  nystatin Powder 1Application(s) Topical two times a day  heparin  Injectable 5000Unit(s) SubCutaneous every 12 hours      REVIEW OF SYSTEMS:  [x] As per HPI  CONSTITUTIONAL: No fever, weight loss, or fatigue  RESPIRATORY: No cough, wheezing, chills or hemoptysis; No Shortness of Breath  CARDIOVASCULAR: No chest pain, palpitations, dizziness, or leg swelling  GASTROINTESTINAL: No abdominal or epigastric pain. No nausea, vomiting, or hematemesis; No diarrhea or constipation. No melena or hematochezia.  MUSCULOSKELETAL: No joint pain or swelling; No muscle, back, or extremity pain  [x] All others negative	  [ ] Unable to obtain    PHYSICAL EXAM:  T(C): 37.3, Max: 37.5 (02-08 @ 05:22)  HR: 66 (66 - 83)  BP: 150/47 (133/64 - 150/47)  RR: 19 (15 - 22)  SpO2: 97% (93% - 99%)  Wt(kg): --  I&O's Summary  I & Os for 24h ending 08 Feb 2017 07:00  =============================================  IN: 100 ml / OUT: 1500 ml / NET: -1400 ml    I & Os for current day (as of 08 Feb 2017 15:26)  =============================================  IN: 210 ml / OUT: 1000 ml / NET: -790 ml        Appearance: Normal	  HEENT:   Normal oral mucosa  Cardiovascular: Normal S1 S2, No JVD, No murmurs, No edema  Respiratory: Lungs clear to auscultation	  Gastrointestinal:  Soft, Non-tender, + BS	  Extremities: Normal range of motion, No clubbing, cyanosis or edema  Vascular: Peripheral pulses palpable 2+ bilaterally    TELEMETRY: 	    ECG:    	  RADIOLOGY:   CXR:  CT:  US:    CARDIAC TESTING:  Echocardiogram:  Catheterization:  Stress Test:      LABS:	 	    CARDIAC MARKERS:                                  7.8    14.6  )-----------( 390      ( 08 Feb 2017 07:05 )             23.9     08 Feb 2017 07:05    137    |  102    |  16     ----------------------------<  96     3.2     |  23     |  0.32     Ca    7.7        08 Feb 2017 07:05  Mg     1.8       08 Feb 2017 07:05      proBNP:   Lipid Profile:   HgA1c:   TSH:     ASSESSMENT/PLAN: 	  1. Decompensated systolic HF - EF 30% with regional akinesis, likely old ischemic event, given volume resiscitation for sepsis and IV abx, went into decompensation, recommend IV lasix goal keep net even to net negative as tolerated.  2. CAD - no further work up given age, dementia and multiple comorbiditeis, med mgmt

## 2017-02-08 NOTE — PROGRESS NOTE ADULT - PROBLEM SELECTOR PLAN 1
Pt febrile overnight - w/ increased leukocytosis. Multiple sources present - CXR showing new infiltrates, UA remarkable for LE and WBC. Pt w/ a stage IV decub ulcer that is foul smelling, however, no purulent. Also w/ gangrenous necrotic toes, however, no signs of purulence.   - c/w Vancomycin/Zosyn - day 6 - f/u Vanc trough on 2/8 at 6pm  - f/u Pulmonology consult (Dr. Boudreaux);   - f/u Blood cx - NGTD; blood cx repeated last night - f/u gram stain  - f/u ID consult (Dr. Rodríguez); recs appreciated  - c/w frequent suctioning for aspiration PNA  - Will consider replacing hussein  - Would care reconsulted - f/u recs  - Will consider Podiatry consult Pt afebrile overnight, still w/ increased leukocytosis. UA remarkable for LE and WBC, CXR w/ infiltrates yesterday. Pt w/ stage IV decub ulcer that is foul smelling, however, no purulent. Also w/ gangrenous necrotic toes, however, no signs of purulence.   - c/w Vancomycin/Zosyn - day 6 - f/u Vanc trough on 2/8 at 6pm  - f/u Pulmonology consult (Dr. Boudreaux);   - f/u Blood cx - NGTD; blood cx repeated last night - f/u gram stain  - f/u ID consult (Dr. Rodríguez); recs appreciated  - c/w frequent suctioning for aspiration PNA  - Will consider replacing hussein  - Would care reconsulted - f/u recs  - Will consider Podiatry consult Pt afebrile overnight, still w/ leukocytosis. UA remarkable for LE and WBC, CXR w/ infiltrates yesterday. Pt w/ stage IV decub ulcer that is foul smelling, however, no purulent. Also w/ gangrenous necrotic toes, however, no signs of purulence. BCx from 01/29 and 02/07 NGTD. Pt was on Vanc/Zosyn, finished 7 day course of Abx today.   - Remove Falcon and TOV today  - c/w wound care regimen  - f/u ID recs (Dr. Rodríguez)

## 2017-02-09 VITALS
HEART RATE: 65 BPM | TEMPERATURE: 98 F | SYSTOLIC BLOOD PRESSURE: 156 MMHG | RESPIRATION RATE: 19 BRPM | OXYGEN SATURATION: 98 % | DIASTOLIC BLOOD PRESSURE: 72 MMHG

## 2017-02-09 LAB
ANION GAP SERPL CALC-SCNC: 10 MMOL/L — SIGNIFICANT CHANGE UP (ref 9–16)
BUN SERPL-MCNC: 17 MG/DL — SIGNIFICANT CHANGE UP (ref 7–23)
CALCIUM SERPL-MCNC: 8 MG/DL — LOW (ref 8.5–10.5)
CHLORIDE SERPL-SCNC: 104 MMOL/L — SIGNIFICANT CHANGE UP (ref 96–108)
CO2 SERPL-SCNC: 24 MMOL/L — SIGNIFICANT CHANGE UP (ref 22–31)
CREAT SERPL-MCNC: 0.38 MG/DL — LOW (ref 0.5–1.3)
GLUCOSE SERPL-MCNC: 201 MG/DL — HIGH (ref 70–99)
HCT VFR BLD CALC: 26.2 % — LOW (ref 34.5–45)
HGB BLD-MCNC: 8.7 G/DL — LOW (ref 11.5–15.5)
MAGNESIUM SERPL-MCNC: 2.2 MG/DL — SIGNIFICANT CHANGE UP (ref 1.6–2.4)
MCHC RBC-ENTMCNC: 24.1 PG — LOW (ref 27–34)
MCHC RBC-ENTMCNC: 33.2 G/DL — SIGNIFICANT CHANGE UP (ref 32–36)
MCV RBC AUTO: 72.6 FL — LOW (ref 80–100)
PLATELET # BLD AUTO: 468 K/UL — HIGH (ref 150–400)
POTASSIUM SERPL-MCNC: 4.2 MMOL/L — SIGNIFICANT CHANGE UP (ref 3.5–5.3)
POTASSIUM SERPL-SCNC: 4.2 MMOL/L — SIGNIFICANT CHANGE UP (ref 3.5–5.3)
RBC # BLD: 3.61 M/UL — LOW (ref 3.8–5.2)
RBC # FLD: 18.8 % — HIGH (ref 10.3–16.9)
SODIUM SERPL-SCNC: 138 MMOL/L — SIGNIFICANT CHANGE UP (ref 135–145)
WBC # BLD: 13.5 K/UL — HIGH (ref 3.8–10.5)
WBC # FLD AUTO: 13.5 K/UL — HIGH (ref 3.8–10.5)

## 2017-02-09 PROCEDURE — 83735 ASSAY OF MAGNESIUM: CPT

## 2017-02-09 PROCEDURE — 99232 SBSQ HOSP IP/OBS MODERATE 35: CPT

## 2017-02-09 PROCEDURE — 84481 FREE ASSAY (FT-3): CPT

## 2017-02-09 PROCEDURE — P9016: CPT

## 2017-02-09 PROCEDURE — 93306 TTE W/DOPPLER COMPLETE: CPT

## 2017-02-09 PROCEDURE — 81003 URINALYSIS AUTO W/O SCOPE: CPT

## 2017-02-09 PROCEDURE — 83036 HEMOGLOBIN GLYCOSYLATED A1C: CPT

## 2017-02-09 PROCEDURE — 99285 EMERGENCY DEPT VISIT HI MDM: CPT | Mod: 25

## 2017-02-09 PROCEDURE — 84300 ASSAY OF URINE SODIUM: CPT

## 2017-02-09 PROCEDURE — 82746 ASSAY OF FOLIC ACID SERUM: CPT

## 2017-02-09 PROCEDURE — 87798 DETECT AGENT NOS DNA AMP: CPT

## 2017-02-09 PROCEDURE — 85027 COMPLETE CBC AUTOMATED: CPT

## 2017-02-09 PROCEDURE — 82533 TOTAL CORTISOL: CPT

## 2017-02-09 PROCEDURE — 82728 ASSAY OF FERRITIN: CPT

## 2017-02-09 PROCEDURE — 87086 URINE CULTURE/COLONY COUNT: CPT

## 2017-02-09 PROCEDURE — 87581 M.PNEUMON DNA AMP PROBE: CPT

## 2017-02-09 PROCEDURE — 83930 ASSAY OF BLOOD OSMOLALITY: CPT

## 2017-02-09 PROCEDURE — 87040 BLOOD CULTURE FOR BACTERIA: CPT

## 2017-02-09 PROCEDURE — 80048 BASIC METABOLIC PNL TOTAL CA: CPT

## 2017-02-09 PROCEDURE — 81001 URINALYSIS AUTO W/SCOPE: CPT

## 2017-02-09 PROCEDURE — 85025 COMPLETE CBC W/AUTO DIFF WBC: CPT

## 2017-02-09 PROCEDURE — 84443 ASSAY THYROID STIM HORMONE: CPT

## 2017-02-09 PROCEDURE — 71045 X-RAY EXAM CHEST 1 VIEW: CPT

## 2017-02-09 PROCEDURE — 80202 ASSAY OF VANCOMYCIN: CPT

## 2017-02-09 PROCEDURE — 83615 LACTATE (LD) (LDH) ENZYME: CPT

## 2017-02-09 PROCEDURE — 83935 ASSAY OF URINE OSMOLALITY: CPT

## 2017-02-09 PROCEDURE — 94640 AIRWAY INHALATION TREATMENT: CPT

## 2017-02-09 PROCEDURE — 86901 BLOOD TYPING SEROLOGIC RH(D): CPT

## 2017-02-09 PROCEDURE — 87633 RESP VIRUS 12-25 TARGETS: CPT

## 2017-02-09 PROCEDURE — 93005 ELECTROCARDIOGRAM TRACING: CPT

## 2017-02-09 PROCEDURE — 80053 COMPREHEN METABOLIC PANEL: CPT

## 2017-02-09 PROCEDURE — 82040 ASSAY OF SERUM ALBUMIN: CPT

## 2017-02-09 PROCEDURE — 86900 BLOOD TYPING SEROLOGIC ABO: CPT

## 2017-02-09 PROCEDURE — 83550 IRON BINDING TEST: CPT

## 2017-02-09 PROCEDURE — 85730 THROMBOPLASTIN TIME PARTIAL: CPT

## 2017-02-09 PROCEDURE — 86850 RBC ANTIBODY SCREEN: CPT

## 2017-02-09 PROCEDURE — 83010 ASSAY OF HAPTOGLOBIN QUANT: CPT

## 2017-02-09 PROCEDURE — 80061 LIPID PANEL: CPT

## 2017-02-09 PROCEDURE — 84439 ASSAY OF FREE THYROXINE: CPT

## 2017-02-09 PROCEDURE — 84484 ASSAY OF TROPONIN QUANT: CPT

## 2017-02-09 PROCEDURE — 82607 VITAMIN B-12: CPT

## 2017-02-09 PROCEDURE — 85045 AUTOMATED RETICULOCYTE COUNT: CPT

## 2017-02-09 PROCEDURE — 82553 CREATINE MB FRACTION: CPT

## 2017-02-09 PROCEDURE — 85610 PROTHROMBIN TIME: CPT

## 2017-02-09 PROCEDURE — 36415 COLL VENOUS BLD VENIPUNCTURE: CPT

## 2017-02-09 PROCEDURE — 87486 CHLMYD PNEUM DNA AMP PROBE: CPT

## 2017-02-09 PROCEDURE — 92610 EVALUATE SWALLOWING FUNCTION: CPT | Mod: GN

## 2017-02-09 PROCEDURE — 36430 TRANSFUSION BLD/BLD COMPNT: CPT

## 2017-02-09 PROCEDURE — 83605 ASSAY OF LACTIC ACID: CPT

## 2017-02-09 PROCEDURE — 86923 COMPATIBILITY TEST ELECTRIC: CPT

## 2017-02-09 RX ADMIN — NYSTATIN CREAM 1 APPLICATION(S): 100000 CREAM TOPICAL at 05:02

## 2017-02-09 RX ADMIN — Medication 1 MILLIGRAM(S): at 11:08

## 2017-02-09 RX ADMIN — HEPARIN SODIUM 5000 UNIT(S): 5000 INJECTION INTRAVENOUS; SUBCUTANEOUS at 05:01

## 2017-02-09 RX ADMIN — Medication 500 MILLIGRAM(S): at 11:08

## 2017-02-09 RX ADMIN — INSULIN HUMAN 2: 100 INJECTION, SOLUTION SUBCUTANEOUS at 06:38

## 2017-02-09 RX ADMIN — INSULIN HUMAN 2: 100 INJECTION, SOLUTION SUBCUTANEOUS at 11:14

## 2017-02-09 RX ADMIN — Medication 40 MILLIGRAM(S): at 05:01

## 2017-02-09 RX ADMIN — PANTOPRAZOLE SODIUM 40 MILLIGRAM(S): 20 TABLET, DELAYED RELEASE ORAL at 11:08

## 2017-02-09 NOTE — PROGRESS NOTE ADULT - PROBLEM SELECTOR PROBLEM 1
Aspiration pneumonia
Dehydration
Dementia
PEG (percutaneous endoscopic gastrostomy) adjustment/replacement/removal
PEG (percutaneous endoscopic gastrostomy) adjustment/replacement/removal
Sepsis
Adult failure to thrive
Adult failure to thrive
Aspiration pneumonia
Aspiration pneumonia
Aspiration pneumonia due to vomit, unspecified laterality, unspecified part of lung
Chronic systolic congestive heart failure
Dementia
Gastrostomy in place
Leucocytosis
Aspiration pneumonia
Aspiration pneumonia due to vomit, unspecified laterality, unspecified part of lung
Sepsis, due to unspecified organism

## 2017-02-09 NOTE — PROGRESS NOTE ADULT - SUBJECTIVE AND OBJECTIVE BOX
Pt seen and examined looking better, no tachypnea on nasal cannula    REVIEW OF SYSTEMS:  patient unable to eat      MEDICATIONS:  MEDICATIONS  (STANDING):  atorvastatin 20milliGRAM(s) Oral at bedtime  folic acid 1milliGRAM(s) Oral daily  ascorbic acid 500milliGRAM(s) Oral daily  nystatin Powder 1Application(s) Topical two times a day  diltiazem    Tablet 180milliGRAM(s) Oral two times a day  insulin regular  human corrective regimen sliding scale  SubCutaneous every 6 hours  heparin  Injectable 5000Unit(s) SubCutaneous every 12 hours  pantoprazole   Suspension 40milliGRAM(s) Oral daily  furosemide    Tablet 40milliGRAM(s) Oral daily    MEDICATIONS  (PRN):  bisacodyl 5milliGRAM(s) Oral every 12 hours PRN Constipation  ALBUTerol/ipratropium for Nebulization 3milliLiter(s) Nebulizer every 4 hours PRN Shortness of Breath and/or Wheezing  acetaminophen   Tablet 650milliGRAM(s) Oral every 6 hours PRN For Temp greater than 38 C (100.4 F)      Allergies    No Known Allergies    Intolerances        Vital Signs Last 24 Hrs  T(C): 36.7, Max: 37.1 (02-08 @ 20:32)  T(F): 98, Max: 98.8 (02-08 @ 20:32)  HR: 65 (65 - 88)  BP: 156/72 (133/59 - 165/65)  BP(mean): --  RR: 19 (18 - 22)  SpO2: 98% (98% - 100%)  I & Os for 24h ending 02-09 @ 07:00  =============================================  IN: 403 ml / OUT: 1600 ml / NET: -1197 ml    I & Os for current day (as of 02-09 @ 13:10)  =============================================  IN: 159 ml / OUT: 0 ml / NET: 159 ml      PHYSICAL EXAM:    General:  in no acute distress  HEENT: MMM, conjunctiva and sclera clear  lungs: decreased breath sounds  heart: regular  Gastrointestinal: Soft non-tender non-distended; Normal bowel sounds; No hepatosplenomegaly  Skin: Warm and dry. No obvious rash  Ext: no change in gangrene    LABS:      CBC Full  -  ( 09 Feb 2017 07:11 )  WBC Count : 13.5 K/uL  Hemoglobin : 8.7 g/dL  Hematocrit : 26.2 %  Platelet Count - Automated : 468 K/uL  Mean Cell Volume : 72.6 fL  Mean Cell Hemoglobin : 24.1 pg  Mean Cell Hemoglobin Concentration : 33.2 g/dL  Auto Neutrophil # : x  Auto Lymphocyte # : x  Auto Monocyte # : x  Auto Eosinophil # : x  Auto Basophil # : x  Auto Neutrophil % : x  Auto Lymphocyte % : x  Auto Monocyte % : x  Auto Eosinophil % : x  Auto Basophil % : x    09 Feb 2017 07:11    138    |  104    |  17     ----------------------------<  201    4.2     |  24     |  0.38     Ca    8.0        09 Feb 2017 07:11  Mg     2.2       09 Feb 2017 07:11                        RADIOLOGY & ADDITIONAL STUDIES (The following images were personally reviewed):

## 2017-02-09 NOTE — PROGRESS NOTE ADULT - SUBJECTIVE AND OBJECTIVE BOX
INTERVAL HPI/OVERNIGHT EVENTS: Pt passed TOV yesterday, no events    ROS: patient non-verbal, unable to obtain ROS      VITAL SIGNS:  T(F): 98.8  HR: 84  BP: 158/70  RR: 20  SpO2: 100%  Wt(kg): --    PHYSICAL EXAM:    Constitutional: Frail, non-verbal, cachectic, severely contracted  HEENT: PERRL, no conjunctival pallor, MMM  Respiratory: CTA B/L, no wheezes, rales, or rhonchi  Cardiovascular: RRR, S1, S2, no M/R/G  Gastrointestinal: BS normoactive, non-distended, +PEG, site looks clean, no drainage   Extremities: WWP, UE/LE severely contracted, stage 4 sacral decubitus ulcer, blue dry gangrenous toes, several pressure ulcer  Vascular: radial and DP pulses 2+ b/l    MEDICATIONS  (STANDING):  atorvastatin 20milliGRAM(s) Oral at bedtime  folic acid 1milliGRAM(s) Oral daily  ascorbic acid 500milliGRAM(s) Oral daily  nystatin Powder 1Application(s) Topical two times a day  diltiazem    Tablet 180milliGRAM(s) Oral two times a day  insulin regular  human corrective regimen sliding scale  SubCutaneous every 6 hours  heparin  Injectable 5000Unit(s) SubCutaneous every 12 hours  pantoprazole   Suspension 40milliGRAM(s) Oral daily  furosemide    Tablet 40milliGRAM(s) Oral daily    MEDICATIONS  (PRN):  bisacodyl 5milliGRAM(s) Oral every 12 hours PRN Constipation  ALBUTerol/ipratropium for Nebulization 3milliLiter(s) Nebulizer every 4 hours PRN Shortness of Breath and/or Wheezing  acetaminophen   Tablet 650milliGRAM(s) Oral every 6 hours PRN For Temp greater than 38 C (100.4 F)      Allergies    No Known Allergies    Intolerances        LABS:                        8.7    13.5  )-----------( 468      ( 09 Feb 2017 07:11 )             26.2     09 Feb 2017 07:11    138    |  104    |  17     ----------------------------<  201    4.2     |  24     |  0.38     Ca    8.0        09 Feb 2017 07:11  Mg     2.2       09 Feb 2017 07:11            RADIOLOGY & ADDITIONAL TESTS:

## 2017-02-09 NOTE — PROGRESS NOTE ADULT - PROBLEM SELECTOR PLAN 3
Echo showing severe akinesia of the apical and inferior LV wall w/ an EF of 30%, w/ LA dilatation. CXR remarkable for pulmonary congestion. No JVD, no edema. Patient received 40mg IV Lasix yesterday, -1.4L. CXR this AM shows improved pulmonary congestion  - c/w Lasix 40mg daily

## 2017-02-09 NOTE — PROGRESS NOTE ADULT - PROVIDER SPECIALTY LIST ADULT
Cardiology
Cardiology
Gastroenterology
Infectious Disease
Internal Medicine
Palliative Care
Vascular Surgery
Infectious Disease

## 2017-02-09 NOTE — PROGRESS NOTE ADULT - PROBLEM SELECTOR PLAN 1
Pt afebrile overnight, still w/ leukocytosis, though downtrending. UA remarkable for LE and WBC, CXR w/ infiltrates yesterday. Pt w/ stage IV decub ulcer that is foul smelling, however, no purulent. Also w/ gangrenous necrotic toes, however, no signs of purulence. BCx from 01/29 and 02/07 NGTD. Pt was on Vanc/Zosyn, finished 7 day course of Abx today.   - c/w wound care regimen  - f/u ID recs (Dr. Rodríguez)

## 2017-02-09 NOTE — PROGRESS NOTE ADULT - ASSESSMENT
Patient s/p treatment for pneumonia  Suggest further evaluation by wound care for decubitus
89 y/o Female w/ a PMHx of multiple CVAs many years ago, contracted and non-verbal at baseline, HTN, and DM2, initially admitted for FTT secondary to poor PO intake, now s/p PEG placement, c/b aspiration PNA w/ an febrile episode overnight and w/ leukocytosis.
89 y/o Female w/ a PMHx of multiple CVAs many years ago, contracted and non-verbal at baseline, HTN, DM2, who was brought in by family member for worsening PO intake, who became unstable s/p PEG but now is more stabilized.
89 y/o Female w/ a PMHx of multiple CVAs many years ago, contracted and non-verbal at baseline, HTN, DM2, who was brought in by family member for worsening PO intake, who became unstable s/p PEG but now is more stabilized.
89 y/o Female w/ a PMHx of multiple CVAs many years ago, contracted and non-verbal at baseline, HTN, and DM2, initially admitted for FTT secondary to poor PO intake, now s/p PEG placement, c/b aspiration PNA w/ an febrile episode overnight and w/ leukocytosis.
89 y/o Female w/ a PMHx of multiple CVAs many years ago, contracted and non-verbal at baseline, HTN, and DM2, initially admitted for FTT secondary to poor PO intake, now s/p PEG placement, c/b aspiration PNA w/ an febrile episode overnight and w/ leukocytosis.
Case discussed with Dr simmons. Interstitial nephirtis seems unlikely
Leukocytosis   Possible aspiration pneumonia
Leukocytosis most likely due to gangrene of foot
PEG done  may use if needed for meds, start feeds tomorrow   d/c plans
Patient is a 89 yo Female pmh of multiple CVAs many years ago, contracted and non-verbal at baseline x 3 years, HTN, DM2, who was brought in by family for worsening PO intake and decline in mental status. Per family, pt had PEG tube placed years ago after stroke left her with significant dysphagia and hemiparesis. Pt's dysphagia improved and pt was able to eat independently. Her PEG tube became dislodged accidentally, so it was left out as pt was eating well. Over last few weeks, pt has had decline in PO intake and overall mental status. In addition, pt has had chronic non-healing wounds which have worsened over last few weeks despite aggressive wound care by family, [facility] wound care and 24 hour aides at home. Seen by their PMD in the home and told to come to the hospital for repeat PEG tube to improve nutritional status in hopes that this might allow for improved wound healing. Was seen by GI with PEG tube being offered. Now with WBC trending upwards PGT placement on hold temporarily
Patient is a 89 yo Female pmh of multiple CVAs many years ago, contracted and non-verbal at baseline x 3 years, HTN, DM2, who was brought in by family for worsening PO intake and decline in mental status. Per family, pt had PEG tube placed years ago after stroke left her with significant dysphagia and hemiparesis. Pt's dysphagia improved and pt was able to eat independently. Her PEG tube became dislodged accidentally, so it was left out as pt was eating well. Over last few weeks, pt has had decline in PO intake and overall mental status. In addition, pt has had chronic non-healing wounds which have worsened over last few weeks despite aggressive wound care by family, [facility] wound care and 24 hour aides at home. Seen by their PMD in the home and told to come to the hospital for repeat PEG tube to improve nutritional status in hopes that this might allow for improved wound healing. Was seen by GI with PEG tube being offered. Now with WBC trending upwards PGT placement on hold temporarily
Patient is a 89 yo Female pmh of multiple CVAs many years ago, 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 Likely will need hospice placement.
Patient is a 89 yo Female pmh of multiple CVAs many years ago, 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 Per family, poor PO intake has been going on for many months, gradually decreasing. Pt with PGT placement on 2/3 with post of run of A Fib with 's-170's and complication of likely aspiration, family opted to begin abt to treat likely PNA treated with )@
Patient is a 89 yo Female pmh of multiple CVAs many years ago, contracted and non-verbal at baseline, HTN, DM2, who was brought in by family member for worsening PO intake , who became unstable s/p PEG but now is more stabilized.
Patient is a 89 yo Female pmh of multiple CVAs many years ago, 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 Likely will need hospice placement.
Patient is a 89 yo Female pmh of multiple CVAs many years ago, contracted and non-verbal at baseline x 3 years, HTN, DM2, who was brought in by family for worsening PO intake and decline in mental status. Per family, pt had PEG tube placed years ago after stroke left her with significant dysphagia and hemiparesis. Pt's dysphagia improved and pt was able to eat independently. Her PEG tube became dislodged accidentally, so it was left out as pt was eating well. Over last few weeks, pt has had decline in PO intake and overall mental status. In addition, pt has had chronic non-healing wounds which have worsened over last few weeks despite aggressive wound care by family, [facility] wound care and 24 hour aides at home. Seen by their PMD in the home and told to come to the hospital for repeat PEG tube to improve nutritional status in hopes that this might allow for improved wound healing. Was seen by GI with PEG tube being offered.
plan is snf prior to homePEG in place
supportive, discussed with family who wants patient to have PEG

## 2017-02-09 NOTE — PROGRESS NOTE ADULT - SUBJECTIVE AND OBJECTIVE BOX
INTERVAL HPI: sob improved s/p lasix non verbal  	  MEDICATIONS:  diltiazem    Tablet 180milliGRAM(s) Oral two times a day  furosemide    Tablet 40milliGRAM(s) Oral daily      ALBUTerol/ipratropium for Nebulization 3milliLiter(s) Nebulizer every 4 hours PRN    acetaminophen   Tablet 650milliGRAM(s) Oral every 6 hours PRN    bisacodyl 5milliGRAM(s) Oral every 12 hours PRN  pantoprazole   Suspension 40milliGRAM(s) Oral daily    atorvastatin 20milliGRAM(s) Oral at bedtime  insulin regular  human corrective regimen sliding scale  SubCutaneous every 6 hours    folic acid 1milliGRAM(s) Oral daily  ascorbic acid 500milliGRAM(s) Oral daily  nystatin Powder 1Application(s) Topical two times a day  heparin  Injectable 5000Unit(s) SubCutaneous every 12 hours      REVIEW OF SYSTEMS:  [x] As per HPI  CONSTITUTIONAL: No fever, weight loss, or fatigue  RESPIRATORY: No cough, wheezing, chills or hemoptysis; No Shortness of Breath  CARDIOVASCULAR: No chest pain, palpitations, dizziness, or leg swelling  GASTROINTESTINAL: No abdominal or epigastric pain. No nausea, vomiting, or hematemesis; No diarrhea or constipation. No melena or hematochezia.  MUSCULOSKELETAL: No joint pain or swelling; No muscle, back, or extremity pain  [x] All others negative	  [ ] Unable to obtain    PHYSICAL EXAM:  T(C): 36.7, Max: 37.1 (02-08 @ 20:32)  HR: 65 (65 - 88)  BP: 156/72 (133/59 - 165/65)  RR: 19 (18 - 22)  SpO2: 98% (98% - 100%)  Wt(kg): --  I&O's Summary  I & Os for 24h ending 09 Feb 2017 07:00  =============================================  IN: 403 ml / OUT: 1600 ml / NET: -1197 ml    I & Os for current day (as of 09 Feb 2017 13:41)  =============================================  IN: 159 ml / OUT: 0 ml / NET: 159 ml      Weight (kg): 42.5 (02-09 @ 11:57)    Appearance: Normal	  HEENT:   Normal oral mucosa  Cardiovascular: Normal S1 S2, No JVD, No murmurs, No edema  Respiratory: Lungs clear to auscultation	  Gastrointestinal:  Soft, Non-tender, + BS	  Extremities: Normal range of motion, No clubbing, cyanosis or edema  Vascular: Peripheral pulses palpable 2+ bilaterally    TELEMETRY: 	    ECG:    	  RADIOLOGY:   CXR:  CT:  US:    CARDIAC TESTING:  Echocardiogram:  Catheterization:  Stress Test:      LABS:	 	    CARDIAC MARKERS:                                  8.7    13.5  )-----------( 468      ( 09 Feb 2017 07:11 )             26.2     09 Feb 2017 07:11    138    |  104    |  17     ----------------------------<  201    4.2     |  24     |  0.38     Ca    8.0        09 Feb 2017 07:11  Mg     2.2       09 Feb 2017 07:11      proBNP:   Lipid Profile:   HgA1c:   TSH:     ASSESSMENT/PLAN: 	  1. Decompensated systolic HF - EF 30% with regional akinesis, likely old ischemic event, given volume resiscitation for sepsis and IV abx, went into decompensation, recommend IV lasix goal keep net even to net negative as tolerated.  2. CAD - no further work up given age, dementia and multiple comorbiditeis, med mgmt

## 2017-02-09 NOTE — PROGRESS NOTE ADULT - PROBLEM SELECTOR PLAN 6
Pt w/ hgb of 7.8 today, s/p 1U of PRBC transfusion yesterday for Hgb < 7. Microcytic in nature. No signs of active bleeding. Iron studies remarkable for AOCD, retic count wnl.  - f/u B12, Folate  - f/u Fecal occult blood  - c/t trend H&H daily  - maintain active T&S, transfuse if hgb < 7  - c/w Protonix for GI ppx Pt w/ hgb of 7.8 today, s/p 1U of PRBC transfusion yesterday for Hgb < 7. Microcytic in nature. No signs of active bleeding. Iron studies remarkable for AOCD, retic count wnl.  - maintain active T&S, transfuse if hgb < 7  - c/w Protonix for GI ppx

## 2017-02-09 NOTE — PROGRESS NOTE ADULT - PROBLEM SELECTOR PROBLEM 10
Need for prophylactic measure
Nutrition, metabolism, and development symptoms

## 2017-02-09 NOTE — PROGRESS NOTE ADULT - PROBLEM SELECTOR PLAN 2
Pt w/ increased work of breathing on 2/6 and CXR was remarkable for bilateral infiltrate and pulmonary congestion. Respiratory distress likely 2/2 pulmonary congestion. Pt s/p Lasix 40mg IV yesterday, comfortable on HFNC this AM. Repeat CXR shows improved pulmonary congestion. Pt weaned off HFNC yesterday, now on 4L NC  - c/w Lasix 40mg daily, goal net even to negative

## 2017-02-09 NOTE — PROGRESS NOTE ADULT - PROBLEM SELECTOR PLAN 7
Chronically contracted and non-verbal. Pt w/ hx of multiple CVA's in the past. s/p PEG tube placement on 2/2. TF started on 2/4.  - c/w Statin for stroke prevention  - c/w Tube feed - will reduce feeding rate in the setting of new b/l infiltrate w/ concerns for aspiration Chronically contracted and non-verbal. Pt w/ hx of multiple CVA's in the past. s/p PEG tube placement on 2/2. TF started on 2/4.  - c/w Statin for stroke prevention  - c/w Tube feed - restarted yesterday

## 2017-02-12 LAB
CULTURE RESULTS: SIGNIFICANT CHANGE UP
CULTURE RESULTS: SIGNIFICANT CHANGE UP
SPECIMEN SOURCE: SIGNIFICANT CHANGE UP
SPECIMEN SOURCE: SIGNIFICANT CHANGE UP

## 2017-02-13 DIAGNOSIS — D64.9 ANEMIA, UNSPECIFIED: ICD-10-CM

## 2017-02-13 DIAGNOSIS — Z79.84 LONG TERM (CURRENT) USE OF ORAL HYPOGLYCEMIC DRUGS: ICD-10-CM

## 2017-02-13 DIAGNOSIS — I69.998 OTHER SEQUELAE FOLLOWING UNSPECIFIED CEREBROVASCULAR DISEASE: ICD-10-CM

## 2017-02-13 DIAGNOSIS — R53.2 FUNCTIONAL QUADRIPLEGIA: ICD-10-CM

## 2017-02-13 DIAGNOSIS — I48.91 UNSPECIFIED ATRIAL FIBRILLATION: ICD-10-CM

## 2017-02-13 DIAGNOSIS — L89.154 PRESSURE ULCER OF SACRAL REGION, STAGE 4: ICD-10-CM

## 2017-02-13 DIAGNOSIS — E11.52 TYPE 2 DIABETES MELLITUS WITH DIABETIC PERIPHERAL ANGIOPATHY WITH GANGRENE: ICD-10-CM

## 2017-02-13 DIAGNOSIS — L89.309 PRESSURE ULCER OF UNSPECIFIED BUTTOCK, UNSPECIFIED STAGE: ICD-10-CM

## 2017-02-13 DIAGNOSIS — L89.894 PRESSURE ULCER OF OTHER SITE, STAGE 4: ICD-10-CM

## 2017-02-13 DIAGNOSIS — I11.0 HYPERTENSIVE HEART DISEASE WITH HEART FAILURE: ICD-10-CM

## 2017-02-13 DIAGNOSIS — I25.10 ATHEROSCLEROTIC HEART DISEASE OF NATIVE CORONARY ARTERY WITHOUT ANGINA PECTORIS: ICD-10-CM

## 2017-02-13 DIAGNOSIS — E78.00 PURE HYPERCHOLESTEROLEMIA, UNSPECIFIED: ICD-10-CM

## 2017-02-13 DIAGNOSIS — A41.9 SEPSIS, UNSPECIFIED ORGANISM: ICD-10-CM

## 2017-02-13 DIAGNOSIS — I69.959 HEMIPLEGIA AND HEMIPARESIS FOLLOWING UNSPECIFIED CEREBROVASCULAR DISEASE AFFECTING UNSPECIFIED SIDE: ICD-10-CM

## 2017-02-13 DIAGNOSIS — I87.2 VENOUS INSUFFICIENCY (CHRONIC) (PERIPHERAL): ICD-10-CM

## 2017-02-13 DIAGNOSIS — F03.90 UNSPECIFIED DEMENTIA, UNSPECIFIED SEVERITY, WITHOUT BEHAVIORAL DISTURBANCE, PSYCHOTIC DISTURBANCE, MOOD DISTURBANCE, AND ANXIETY: ICD-10-CM

## 2017-02-13 DIAGNOSIS — E86.0 DEHYDRATION: ICD-10-CM

## 2017-02-13 DIAGNOSIS — I50.22 CHRONIC SYSTOLIC (CONGESTIVE) HEART FAILURE: ICD-10-CM

## 2017-02-13 DIAGNOSIS — I50.23 ACUTE ON CHRONIC SYSTOLIC (CONGESTIVE) HEART FAILURE: ICD-10-CM

## 2017-02-13 DIAGNOSIS — L89.890 PRESSURE ULCER OF OTHER SITE, UNSTAGEABLE: ICD-10-CM

## 2017-02-13 DIAGNOSIS — Z51.5 ENCOUNTER FOR PALLIATIVE CARE: ICD-10-CM

## 2017-02-13 DIAGNOSIS — E87.1 HYPO-OSMOLALITY AND HYPONATREMIA: ICD-10-CM

## 2017-02-13 DIAGNOSIS — L89.150 PRESSURE ULCER OF SACRAL REGION, UNSTAGEABLE: ICD-10-CM

## 2017-02-13 DIAGNOSIS — Z74.01 BED CONFINEMENT STATUS: ICD-10-CM

## 2017-02-13 DIAGNOSIS — R13.19 OTHER DYSPHAGIA: ICD-10-CM

## 2017-02-13 DIAGNOSIS — J69.0 PNEUMONITIS DUE TO INHALATION OF FOOD AND VOMIT: ICD-10-CM

## 2017-02-13 DIAGNOSIS — E86.1 HYPOVOLEMIA: ICD-10-CM

## 2017-02-13 DIAGNOSIS — R62.7 ADULT FAILURE TO THRIVE: ICD-10-CM

## 2017-02-13 DIAGNOSIS — I69.991 DYSPHAGIA FOLLOWING UNSPECIFIED CEREBROVASCULAR DISEASE: ICD-10-CM

## 2017-02-14 DIAGNOSIS — K44.9 DIAPHRAGMATIC HERNIA WITHOUT OBSTRUCTION OR GANGRENE: ICD-10-CM

## 2017-02-14 DIAGNOSIS — K26.9 DUODENAL ULCER, UNSPECIFIED AS ACUTE OR CHRONIC, WITHOUT HEMORRHAGE OR PERFORATION: ICD-10-CM

## 2023-03-02 NOTE — DISCHARGE NOTE ADULT - NS AS DC AMI YN
normal appearance , without tenderness upon palpation , no deformities , trachea midline , Thyroid normal size , no masses , thyroid nontender no

## 2025-08-04 NOTE — PROGRESS NOTE ADULT - PROBLEM SELECTOR PROBLEM 7
Iron supplement and Pylera (treatment for H. pylori infection) may not be taken at the same time; iron can decrease the concentration of Pylera in your bloodstream, and Pylera can decrease absorption of iron if they are taken together:   Please take your iron supplement first thing in the morning with breakfast.    Pylera is taken 4 times daily (after meals and nightly). Take Pylera 2 hours after breakfast, and again after lunch, after dinner and at bedtime.     Regarding Lantus (glargine): Please take 5 units at bedtime tonight. Tomorrow 8/5 take 5 units in the morning, resume 25 units nightly   Dementia